# Patient Record
Sex: MALE | Race: WHITE | NOT HISPANIC OR LATINO | Employment: OTHER | URBAN - NONMETROPOLITAN AREA
[De-identification: names, ages, dates, MRNs, and addresses within clinical notes are randomized per-mention and may not be internally consistent; named-entity substitution may affect disease eponyms.]

---

## 2017-02-21 ENCOUNTER — OUTSIDE FACILITY SERVICE (OUTPATIENT)
Dept: FAMILY MEDICINE CLINIC | Facility: CLINIC | Age: 77
End: 2017-02-21

## 2017-02-21 PROCEDURE — 99308 SBSQ NF CARE LOW MDM 20: CPT | Performed by: FAMILY MEDICINE

## 2017-03-02 ENCOUNTER — OUTSIDE FACILITY SERVICE (OUTPATIENT)
Dept: FAMILY MEDICINE CLINIC | Facility: CLINIC | Age: 77
End: 2017-03-02

## 2017-03-02 PROCEDURE — 99308 SBSQ NF CARE LOW MDM 20: CPT | Performed by: FAMILY MEDICINE

## 2017-03-09 ENCOUNTER — OUTSIDE FACILITY SERVICE (OUTPATIENT)
Dept: FAMILY MEDICINE CLINIC | Facility: CLINIC | Age: 77
End: 2017-03-09

## 2017-03-09 PROCEDURE — 99308 SBSQ NF CARE LOW MDM 20: CPT | Performed by: FAMILY MEDICINE

## 2017-03-22 ENCOUNTER — OUTSIDE FACILITY SERVICE (OUTPATIENT)
Dept: FAMILY MEDICINE CLINIC | Facility: CLINIC | Age: 77
End: 2017-03-22

## 2017-03-22 PROCEDURE — 99308 SBSQ NF CARE LOW MDM 20: CPT | Performed by: FAMILY MEDICINE

## 2017-03-28 ENCOUNTER — OUTSIDE FACILITY SERVICE (OUTPATIENT)
Dept: FAMILY MEDICINE CLINIC | Facility: CLINIC | Age: 77
End: 2017-03-28

## 2017-03-28 PROCEDURE — 99308 SBSQ NF CARE LOW MDM 20: CPT | Performed by: FAMILY MEDICINE

## 2017-04-10 ENCOUNTER — OUTSIDE FACILITY SERVICE (OUTPATIENT)
Dept: FAMILY MEDICINE CLINIC | Facility: CLINIC | Age: 77
End: 2017-04-10

## 2017-04-11 ENCOUNTER — OUTSIDE FACILITY SERVICE (OUTPATIENT)
Dept: FAMILY MEDICINE CLINIC | Facility: CLINIC | Age: 77
End: 2017-04-11

## 2017-04-11 PROCEDURE — 99308 SBSQ NF CARE LOW MDM 20: CPT | Performed by: FAMILY MEDICINE

## 2017-04-19 ENCOUNTER — OUTSIDE FACILITY SERVICE (OUTPATIENT)
Dept: FAMILY MEDICINE CLINIC | Facility: CLINIC | Age: 77
End: 2017-04-19

## 2017-04-19 PROCEDURE — 99308 SBSQ NF CARE LOW MDM 20: CPT | Performed by: FAMILY MEDICINE

## 2017-04-25 ENCOUNTER — OUTSIDE FACILITY SERVICE (OUTPATIENT)
Dept: FAMILY MEDICINE CLINIC | Facility: CLINIC | Age: 77
End: 2017-04-25

## 2017-04-25 PROCEDURE — 99308 SBSQ NF CARE LOW MDM 20: CPT | Performed by: FAMILY MEDICINE

## 2017-05-03 ENCOUNTER — OUTSIDE FACILITY SERVICE (OUTPATIENT)
Dept: FAMILY MEDICINE CLINIC | Facility: CLINIC | Age: 77
End: 2017-05-03

## 2017-05-03 PROCEDURE — 99308 SBSQ NF CARE LOW MDM 20: CPT | Performed by: FAMILY MEDICINE

## 2017-05-09 ENCOUNTER — OUTSIDE FACILITY SERVICE (OUTPATIENT)
Dept: FAMILY MEDICINE CLINIC | Facility: CLINIC | Age: 77
End: 2017-05-09

## 2017-05-09 PROCEDURE — 99308 SBSQ NF CARE LOW MDM 20: CPT | Performed by: FAMILY MEDICINE

## 2017-05-17 ENCOUNTER — OUTSIDE FACILITY SERVICE (OUTPATIENT)
Dept: FAMILY MEDICINE CLINIC | Facility: CLINIC | Age: 77
End: 2017-05-17

## 2017-05-17 PROCEDURE — 99308 SBSQ NF CARE LOW MDM 20: CPT | Performed by: FAMILY MEDICINE

## 2017-05-24 ENCOUNTER — OUTSIDE FACILITY SERVICE (OUTPATIENT)
Dept: FAMILY MEDICINE CLINIC | Facility: CLINIC | Age: 77
End: 2017-05-24

## 2017-05-24 PROCEDURE — 99308 SBSQ NF CARE LOW MDM 20: CPT | Performed by: FAMILY MEDICINE

## 2017-06-06 ENCOUNTER — OUTSIDE FACILITY SERVICE (OUTPATIENT)
Dept: FAMILY MEDICINE CLINIC | Facility: CLINIC | Age: 77
End: 2017-06-06

## 2017-06-06 PROCEDURE — 99308 SBSQ NF CARE LOW MDM 20: CPT | Performed by: FAMILY MEDICINE

## 2017-06-09 ENCOUNTER — OUTSIDE FACILITY SERVICE (OUTPATIENT)
Dept: FAMILY MEDICINE CLINIC | Facility: CLINIC | Age: 77
End: 2017-06-09

## 2017-06-09 PROCEDURE — 99308 SBSQ NF CARE LOW MDM 20: CPT | Performed by: NURSE PRACTITIONER

## 2017-06-16 ENCOUNTER — OUTSIDE FACILITY SERVICE (OUTPATIENT)
Dept: FAMILY MEDICINE CLINIC | Facility: CLINIC | Age: 77
End: 2017-06-16

## 2017-06-16 PROCEDURE — 99308 SBSQ NF CARE LOW MDM 20: CPT | Performed by: NURSE PRACTITIONER

## 2017-06-20 ENCOUNTER — OUTSIDE FACILITY SERVICE (OUTPATIENT)
Dept: FAMILY MEDICINE CLINIC | Facility: CLINIC | Age: 77
End: 2017-06-20

## 2017-06-20 PROCEDURE — 99308 SBSQ NF CARE LOW MDM 20: CPT | Performed by: FAMILY MEDICINE

## 2017-06-23 ENCOUNTER — OUTSIDE FACILITY SERVICE (OUTPATIENT)
Dept: FAMILY MEDICINE CLINIC | Facility: CLINIC | Age: 77
End: 2017-06-23

## 2017-06-23 PROCEDURE — 99308 SBSQ NF CARE LOW MDM 20: CPT | Performed by: NURSE PRACTITIONER

## 2017-06-28 ENCOUNTER — OUTSIDE FACILITY SERVICE (OUTPATIENT)
Dept: FAMILY MEDICINE CLINIC | Facility: CLINIC | Age: 77
End: 2017-06-28

## 2017-06-28 PROCEDURE — 99308 SBSQ NF CARE LOW MDM 20: CPT | Performed by: FAMILY MEDICINE

## 2017-07-07 ENCOUNTER — OUTSIDE FACILITY SERVICE (OUTPATIENT)
Dept: FAMILY MEDICINE CLINIC | Facility: CLINIC | Age: 77
End: 2017-07-07

## 2017-07-07 PROCEDURE — 99308 SBSQ NF CARE LOW MDM 20: CPT | Performed by: NURSE PRACTITIONER

## 2017-07-13 ENCOUNTER — OUTSIDE FACILITY SERVICE (OUTPATIENT)
Dept: FAMILY MEDICINE CLINIC | Facility: CLINIC | Age: 77
End: 2017-07-13

## 2017-07-13 PROCEDURE — 99308 SBSQ NF CARE LOW MDM 20: CPT | Performed by: NURSE PRACTITIONER

## 2017-07-27 ENCOUNTER — OUTSIDE FACILITY SERVICE (OUTPATIENT)
Dept: FAMILY MEDICINE CLINIC | Facility: CLINIC | Age: 77
End: 2017-07-27

## 2017-07-27 PROCEDURE — 99308 SBSQ NF CARE LOW MDM 20: CPT | Performed by: NURSE PRACTITIONER

## 2017-08-03 ENCOUNTER — OUTSIDE FACILITY SERVICE (OUTPATIENT)
Dept: FAMILY MEDICINE CLINIC | Facility: CLINIC | Age: 77
End: 2017-08-03

## 2017-08-03 PROCEDURE — 99308 SBSQ NF CARE LOW MDM 20: CPT | Performed by: NURSE PRACTITIONER

## 2017-08-10 ENCOUNTER — OUTSIDE FACILITY SERVICE (OUTPATIENT)
Dept: FAMILY MEDICINE CLINIC | Facility: CLINIC | Age: 77
End: 2017-08-10

## 2017-08-10 PROCEDURE — 99308 SBSQ NF CARE LOW MDM 20: CPT | Performed by: NURSE PRACTITIONER

## 2017-08-24 ENCOUNTER — OUTSIDE FACILITY SERVICE (OUTPATIENT)
Dept: FAMILY MEDICINE CLINIC | Facility: CLINIC | Age: 77
End: 2017-08-24

## 2017-08-24 PROCEDURE — 99308 SBSQ NF CARE LOW MDM 20: CPT | Performed by: NURSE PRACTITIONER

## 2017-08-29 ENCOUNTER — OUTSIDE FACILITY SERVICE (OUTPATIENT)
Dept: FAMILY MEDICINE CLINIC | Facility: CLINIC | Age: 77
End: 2017-08-29

## 2017-08-29 PROCEDURE — 99309 SBSQ NF CARE MODERATE MDM 30: CPT | Performed by: FAMILY MEDICINE

## 2017-09-07 ENCOUNTER — OUTSIDE FACILITY SERVICE (OUTPATIENT)
Dept: FAMILY MEDICINE CLINIC | Facility: CLINIC | Age: 77
End: 2017-09-07

## 2017-09-07 PROCEDURE — 99308 SBSQ NF CARE LOW MDM 20: CPT | Performed by: NURSE PRACTITIONER

## 2017-09-27 ENCOUNTER — OUTSIDE FACILITY SERVICE (OUTPATIENT)
Dept: FAMILY MEDICINE CLINIC | Facility: CLINIC | Age: 77
End: 2017-09-27

## 2017-09-27 PROCEDURE — 99309 SBSQ NF CARE MODERATE MDM 30: CPT | Performed by: FAMILY MEDICINE

## 2017-09-28 ENCOUNTER — OUTSIDE FACILITY SERVICE (OUTPATIENT)
Dept: FAMILY MEDICINE CLINIC | Facility: CLINIC | Age: 77
End: 2017-09-28

## 2017-09-28 PROCEDURE — 99308 SBSQ NF CARE LOW MDM 20: CPT | Performed by: NURSE PRACTITIONER

## 2017-10-10 ENCOUNTER — OUTSIDE FACILITY SERVICE (OUTPATIENT)
Dept: FAMILY MEDICINE CLINIC | Facility: CLINIC | Age: 77
End: 2017-10-10

## 2017-10-10 PROCEDURE — 99309 SBSQ NF CARE MODERATE MDM 30: CPT | Performed by: FAMILY MEDICINE

## 2017-10-12 ENCOUNTER — OUTSIDE FACILITY SERVICE (OUTPATIENT)
Dept: FAMILY MEDICINE CLINIC | Facility: CLINIC | Age: 77
End: 2017-10-12

## 2017-10-12 PROCEDURE — 99308 SBSQ NF CARE LOW MDM 20: CPT | Performed by: NURSE PRACTITIONER

## 2017-10-24 ENCOUNTER — OUTSIDE FACILITY SERVICE (OUTPATIENT)
Dept: FAMILY MEDICINE CLINIC | Facility: CLINIC | Age: 77
End: 2017-10-24

## 2017-10-24 PROCEDURE — 99309 SBSQ NF CARE MODERATE MDM 30: CPT | Performed by: FAMILY MEDICINE

## 2017-10-26 ENCOUNTER — OUTSIDE FACILITY SERVICE (OUTPATIENT)
Dept: FAMILY MEDICINE CLINIC | Facility: CLINIC | Age: 77
End: 2017-10-26

## 2017-10-26 PROCEDURE — 99308 SBSQ NF CARE LOW MDM 20: CPT | Performed by: NURSE PRACTITIONER

## 2017-11-08 ENCOUNTER — OUTSIDE FACILITY SERVICE (OUTPATIENT)
Dept: FAMILY MEDICINE CLINIC | Facility: CLINIC | Age: 77
End: 2017-11-08

## 2017-11-08 PROCEDURE — 99309 SBSQ NF CARE MODERATE MDM 30: CPT | Performed by: FAMILY MEDICINE

## 2017-11-09 ENCOUNTER — OUTSIDE FACILITY SERVICE (OUTPATIENT)
Dept: FAMILY MEDICINE CLINIC | Facility: CLINIC | Age: 77
End: 2017-11-09

## 2017-11-09 PROCEDURE — 99308 SBSQ NF CARE LOW MDM 20: CPT | Performed by: NURSE PRACTITIONER

## 2017-11-30 ENCOUNTER — OUTSIDE FACILITY SERVICE (OUTPATIENT)
Dept: FAMILY MEDICINE CLINIC | Facility: CLINIC | Age: 77
End: 2017-11-30

## 2017-11-30 PROCEDURE — 99308 SBSQ NF CARE LOW MDM 20: CPT | Performed by: NURSE PRACTITIONER

## 2017-12-05 ENCOUNTER — OUTSIDE FACILITY SERVICE (OUTPATIENT)
Dept: FAMILY MEDICINE CLINIC | Facility: CLINIC | Age: 77
End: 2017-12-05

## 2017-12-05 PROCEDURE — 99309 SBSQ NF CARE MODERATE MDM 30: CPT | Performed by: FAMILY MEDICINE

## 2017-12-19 ENCOUNTER — OUTSIDE FACILITY SERVICE (OUTPATIENT)
Dept: FAMILY MEDICINE CLINIC | Facility: CLINIC | Age: 77
End: 2017-12-19

## 2017-12-19 PROCEDURE — 99309 SBSQ NF CARE MODERATE MDM 30: CPT | Performed by: FAMILY MEDICINE

## 2017-12-21 ENCOUNTER — OUTSIDE FACILITY SERVICE (OUTPATIENT)
Dept: FAMILY MEDICINE CLINIC | Facility: CLINIC | Age: 77
End: 2017-12-21

## 2017-12-21 PROCEDURE — 99308 SBSQ NF CARE LOW MDM 20: CPT | Performed by: NURSE PRACTITIONER

## 2017-12-28 ENCOUNTER — LAB REQUISITION (OUTPATIENT)
Dept: LAB | Facility: HOSPITAL | Age: 77
End: 2017-12-28

## 2017-12-28 DIAGNOSIS — Z00.00 ROUTINE GENERAL MEDICAL EXAMINATION AT A HEALTH CARE FACILITY: ICD-10-CM

## 2017-12-28 DIAGNOSIS — D64.9 ANEMIA: ICD-10-CM

## 2017-12-28 DIAGNOSIS — R68.89 OTHER GENERAL SYMPTOMS AND SIGNS: ICD-10-CM

## 2017-12-28 LAB
ALBUMIN SERPL-MCNC: 3.5 G/DL (ref 3.2–4.8)
ALBUMIN/GLOB SERPL: 1.4 G/DL (ref 1.5–2.5)
ALP SERPL-CCNC: 86 U/L (ref 25–100)
ALT SERPL W P-5'-P-CCNC: 20 U/L (ref 7–40)
ANION GAP SERPL CALCULATED.3IONS-SCNC: 5 MMOL/L (ref 3–11)
AST SERPL-CCNC: 48 U/L (ref 0–33)
BASOPHILS # BLD AUTO: 0.01 10*3/MM3 (ref 0–0.2)
BASOPHILS NFR BLD AUTO: 0.2 % (ref 0–1)
BILIRUB SERPL-MCNC: 0.2 MG/DL (ref 0.3–1.2)
BUN BLD-MCNC: 23 MG/DL (ref 9–23)
BUN/CREAT SERPL: 28.8 (ref 7–25)
CALCIUM SPEC-SCNC: 8.3 MG/DL (ref 8.7–10.4)
CHLORIDE SERPL-SCNC: 105 MMOL/L (ref 99–109)
CO2 SERPL-SCNC: 32 MMOL/L (ref 20–31)
CREAT BLD-MCNC: 0.8 MG/DL (ref 0.6–1.3)
DEPRECATED RDW RBC AUTO: 48.5 FL (ref 37–54)
EOSINOPHIL # BLD AUTO: 0 10*3/MM3 (ref 0–0.3)
EOSINOPHIL NFR BLD AUTO: 0 % (ref 0–3)
ERYTHROCYTE [DISTWIDTH] IN BLOOD BY AUTOMATED COUNT: 14.2 % (ref 11.3–14.5)
GFR SERPL CREATININE-BSD FRML MDRD: 93 ML/MIN/1.73
GLOBULIN UR ELPH-MCNC: 2.5 GM/DL
GLUCOSE BLD-MCNC: 88 MG/DL (ref 70–100)
HCT VFR BLD AUTO: 37.7 % (ref 38.9–50.9)
HGB BLD-MCNC: 12 G/DL (ref 13.1–17.5)
IMM GRANULOCYTES # BLD: 0.01 10*3/MM3 (ref 0–0.03)
IMM GRANULOCYTES NFR BLD: 0.2 % (ref 0–0.6)
LYMPHOCYTES # BLD AUTO: 0.89 10*3/MM3 (ref 0.6–4.8)
LYMPHOCYTES NFR BLD AUTO: 19.2 % (ref 24–44)
MCH RBC QN AUTO: 29.7 PG (ref 27–31)
MCHC RBC AUTO-ENTMCNC: 31.8 G/DL (ref 32–36)
MCV RBC AUTO: 93.3 FL (ref 80–99)
MONOCYTES # BLD AUTO: 0.39 10*3/MM3 (ref 0–1)
MONOCYTES NFR BLD AUTO: 8.4 % (ref 0–12)
NEUTROPHILS # BLD AUTO: 3.34 10*3/MM3 (ref 1.5–8.3)
NEUTROPHILS NFR BLD AUTO: 72 % (ref 41–71)
PLATELET # BLD AUTO: 168 10*3/MM3 (ref 150–450)
PMV BLD AUTO: 10.7 FL (ref 6–12)
POTASSIUM BLD-SCNC: 3.9 MMOL/L (ref 3.5–5.5)
PROT SERPL-MCNC: 6 G/DL (ref 5.7–8.2)
RBC # BLD AUTO: 4.04 10*6/MM3 (ref 4.2–5.76)
SODIUM BLD-SCNC: 142 MMOL/L (ref 132–146)
WBC NRBC COR # BLD: 4.64 10*3/MM3 (ref 3.5–10.8)

## 2017-12-28 PROCEDURE — 80053 COMPREHEN METABOLIC PANEL: CPT

## 2017-12-28 PROCEDURE — 85025 COMPLETE CBC W/AUTO DIFF WBC: CPT

## 2018-01-09 ENCOUNTER — OUTSIDE FACILITY SERVICE (OUTPATIENT)
Dept: INTERNAL MEDICINE | Facility: CLINIC | Age: 78
End: 2018-01-09

## 2018-01-09 PROCEDURE — 99309 SBSQ NF CARE MODERATE MDM 30: CPT | Performed by: FAMILY MEDICINE

## 2018-01-18 ENCOUNTER — OUTSIDE FACILITY SERVICE (OUTPATIENT)
Dept: FAMILY MEDICINE CLINIC | Facility: CLINIC | Age: 78
End: 2018-01-18

## 2018-01-18 ENCOUNTER — OUTSIDE FACILITY SERVICE (OUTPATIENT)
Dept: INTERNAL MEDICINE | Facility: CLINIC | Age: 78
End: 2018-01-18

## 2018-01-18 PROCEDURE — 99309 SBSQ NF CARE MODERATE MDM 30: CPT | Performed by: FAMILY MEDICINE

## 2018-01-18 PROCEDURE — 99308 SBSQ NF CARE LOW MDM 20: CPT | Performed by: NURSE PRACTITIONER

## 2018-01-31 ENCOUNTER — OUTSIDE FACILITY SERVICE (OUTPATIENT)
Dept: INTERNAL MEDICINE | Facility: CLINIC | Age: 78
End: 2018-01-31

## 2018-01-31 PROCEDURE — 99309 SBSQ NF CARE MODERATE MDM 30: CPT | Performed by: FAMILY MEDICINE

## 2018-02-08 ENCOUNTER — OUTSIDE FACILITY SERVICE (OUTPATIENT)
Dept: FAMILY MEDICINE CLINIC | Facility: CLINIC | Age: 78
End: 2018-02-08

## 2018-02-08 PROCEDURE — 99308 SBSQ NF CARE LOW MDM 20: CPT | Performed by: NURSE PRACTITIONER

## 2018-02-13 ENCOUNTER — OUTSIDE FACILITY SERVICE (OUTPATIENT)
Dept: INTERNAL MEDICINE | Facility: CLINIC | Age: 78
End: 2018-02-13

## 2018-02-13 PROCEDURE — 99309 SBSQ NF CARE MODERATE MDM 30: CPT | Performed by: FAMILY MEDICINE

## 2018-02-27 ENCOUNTER — OUTSIDE FACILITY SERVICE (OUTPATIENT)
Dept: INTERNAL MEDICINE | Facility: CLINIC | Age: 78
End: 2018-02-27

## 2018-02-27 PROCEDURE — 99309 SBSQ NF CARE MODERATE MDM 30: CPT | Performed by: FAMILY MEDICINE

## 2018-03-07 ENCOUNTER — OUTSIDE FACILITY SERVICE (OUTPATIENT)
Dept: INTERNAL MEDICINE | Facility: CLINIC | Age: 78
End: 2018-03-07

## 2018-03-07 PROCEDURE — 99309 SBSQ NF CARE MODERATE MDM 30: CPT | Performed by: FAMILY MEDICINE

## 2018-03-10 ENCOUNTER — APPOINTMENT (OUTPATIENT)
Dept: CT IMAGING | Facility: HOSPITAL | Age: 78
End: 2018-03-10

## 2018-03-10 ENCOUNTER — HOSPITAL ENCOUNTER (EMERGENCY)
Facility: HOSPITAL | Age: 78
Discharge: NURSING, REHAB OR LONG-TERM CARE | End: 2018-03-10
Attending: STUDENT IN AN ORGANIZED HEALTH CARE EDUCATION/TRAINING PROGRAM | Admitting: STUDENT IN AN ORGANIZED HEALTH CARE EDUCATION/TRAINING PROGRAM

## 2018-03-10 ENCOUNTER — APPOINTMENT (OUTPATIENT)
Dept: GENERAL RADIOLOGY | Facility: HOSPITAL | Age: 78
End: 2018-03-10

## 2018-03-10 VITALS
RESPIRATION RATE: 16 BRPM | OXYGEN SATURATION: 95 % | WEIGHT: 120 LBS | HEIGHT: 70 IN | BODY MASS INDEX: 17.18 KG/M2 | DIASTOLIC BLOOD PRESSURE: 58 MMHG | SYSTOLIC BLOOD PRESSURE: 101 MMHG | TEMPERATURE: 97.5 F | HEART RATE: 49 BPM

## 2018-03-10 DIAGNOSIS — S01.81XA LACERATION OF MULTIPLE SITES OF FACE: ICD-10-CM

## 2018-03-10 DIAGNOSIS — S00.93XA CONTUSION OF HEAD, INITIAL ENCOUNTER: ICD-10-CM
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
## 2018-03-10 PROCEDURE — 99283 EMERGENCY DEPT VISIT LOW MDM: CPT

## 2018-03-10 PROCEDURE — 72125 CT NECK SPINE W/O DYE: CPT

## 2018-03-10 PROCEDURE — 70450 CT HEAD/BRAIN W/O DYE: CPT

## 2018-03-10 PROCEDURE — 72131 CT LUMBAR SPINE W/O DYE: CPT

## 2018-03-10 PROCEDURE — 70486 CT MAXILLOFACIAL W/O DYE: CPT

## 2018-03-10 PROCEDURE — 73523 X-RAY EXAM HIPS BI 5/> VIEWS: CPT

## 2018-03-10 RX ORDER — RIVASTIGMINE TARTRATE 3 MG/1
3 CAPSULE ORAL 2 TIMES DAILY WITH MEALS
COMMUNITY

## 2018-03-10 RX ORDER — DIAZEPAM 10 MG/1
10 TABLET ORAL 2 TIMES DAILY PRN
COMMUNITY
End: 2018-11-10 | Stop reason: SDUPTHER

## 2018-03-10 RX ORDER — MIRTAZAPINE 15 MG/1
7.5 TABLET, FILM COATED ORAL NIGHTLY
COMMUNITY

## 2018-03-10 RX ORDER — ATROPINE SULFATE 10 MG/ML
1 SOLUTION/ DROPS OPHTHALMIC 2 TIMES DAILY
COMMUNITY

## 2018-03-10 RX ORDER — IPRATROPIUM BROMIDE AND ALBUTEROL SULFATE 2.5; .5 MG/3ML; MG/3ML
3 SOLUTION RESPIRATORY (INHALATION) EVERY 4 HOURS PRN
COMMUNITY

## 2018-03-10 RX ORDER — HYDROCODONE BITARTRATE AND ACETAMINOPHEN 10; 325 MG/1; MG/1
1 TABLET ORAL EVERY 4 HOURS PRN
COMMUNITY
End: 2018-10-10 | Stop reason: SDUPTHER

## 2018-03-10 RX ORDER — PAROXETINE 30 MG/1
30 TABLET, FILM COATED ORAL EVERY MORNING
COMMUNITY

## 2018-03-10 RX ORDER — LACTULOSE 10 G/15ML
20 SOLUTION ORAL 2 TIMES DAILY PRN
COMMUNITY

## 2018-03-10 RX ORDER — ACETAMINOPHEN 500 MG
500 TABLET ORAL EVERY 6 HOURS PRN
COMMUNITY

## 2018-03-10 RX ORDER — ASPIRIN 81 MG/1
81 TABLET, CHEWABLE ORAL DAILY
COMMUNITY

## 2018-03-10 RX ORDER — AMMONIUM LACTATE 120 MG/G
CREAM TOPICAL AS NEEDED
COMMUNITY

## 2018-03-10 RX ORDER — TRAZODONE HYDROCHLORIDE 50 MG/1
50 TABLET ORAL NIGHTLY
COMMUNITY

## 2018-03-10 RX ORDER — BISACODYL 10 MG
10 SUPPOSITORY, RECTAL RECTAL DAILY
COMMUNITY

## 2018-03-10 RX ORDER — RANITIDINE 150 MG/1
150 TABLET ORAL 2 TIMES DAILY
COMMUNITY

## 2018-03-11 NOTE — DISCHARGE INSTRUCTIONS
Contusion  A contusion is a deep bruise. Contusions happen when an injury causes bleeding under the skin. Symptoms of bruising include pain, swelling, and discolored skin. The skin may turn blue, purple, or yellow.  Follow these instructions at home:  · Rest the injured area.  · If told, put ice on the injured area.  ¨ Put ice in a plastic bag.  ¨ Place a towel between your skin and the bag.  ¨ Leave the ice on for 20 minutes, 2-3 times per day.  · If told, put light pressure (compression) on the injured area using an elastic bandage. Make sure the bandage is not too tight. Remove it and put it back on as told by your doctor.  · If possible, raise (elevate) the injured area above the level of your heart while you are sitting or lying down.  · Take over-the-counter and prescription medicines only as told by your doctor.  Contact a doctor if:  · Your symptoms do not get better after several days of treatment.  · Your symptoms get worse.  · You have trouble moving the injured area.  Get help right away if:  · You have very bad pain.  · You have a loss of feeling (numbness) in a hand or foot.  · Your hand or foot turns pale or cold.  This information is not intended to replace advice given to you by your health care provider. Make sure you discuss any questions you have with your health care provider.  Document Released: 06/05/2009 Document Revised: 05/25/2017 Document Reviewed: 05/04/2016  [employer] Interactive Patient Education © 2017 [employer]      Facial Laceration  A facial laceration is a cut on the face. These injuries can be painful and cause bleeding. Some cuts may need to be closed with stitches (sutures), skin adhesive strips, or wound glue. Cuts usually heal quickly but can leave a scar. It can take 1-2 years for the scar to go away completely.  Follow these instructions at home:  · Only take medicines as told by your doctor.  · Follow your doctor's instructions for wound care.  For Stitches:   · Keep the  cut clean and dry.  · If you have a bandage (dressing), change it at least once a day. Change the bandage if it gets wet or dirty, or as told by your doctor.  · Wash the cut with soap and water 2 times a day. Rinse the cut with water. Pat it dry with a clean towel.  · Put a thin layer of medicated cream on the cut as told by your doctor.  · You may shower after the first 24 hours. Do not soak the cut in water until the stitches are removed.  · Have your stitches removed as told by your doctor.  · Do not wear any makeup until a few days after your stitches are removed.  For Skin Adhesive Strips:   · Keep the cut clean and dry.  · Do not get the strips wet. You may take a bath, but be careful to keep the cut dry.  · If the cut gets wet, pat it dry with a clean towel.  · The strips will fall off on their own. Do not remove the strips that are still stuck to the cut.  For Wound Glue:   · You may shower or take baths. Do not soak or scrub the cut. Do not swim. Avoid heavy sweating until the glue falls off on its own. After a shower or bath, pat the cut dry with a clean towel.  · Do not put medicine or makeup on your cut until the glue falls off.  · If you have a bandage, do not put tape over the glue.  · Avoid lots of sunlight or tanning lamps until the glue falls off.  · The glue will fall off on its own in 5-10 days. Do not pick at the glue.  After Healing:   · Put sunscreen on the cut for the first year to reduce your scar.  Contact a doctor if:  · You have a fever.  Get help right away if:  · Your cut area gets red, painful, or puffy (swollen).  · You see a yellowish-white fluid (pus) coming from the cut.  This information is not intended to replace advice given to you by your health care provider. Make sure you discuss any questions you have with your health care provider.  Document Released: 06/05/2009 Document Revised: 05/25/2017 Document Reviewed: 07/31/2014  [employer] Interactive Patient Education © 2017 [employer]

## 2018-03-11 NOTE — ED PROVIDER NOTES
Subjective   This is a 78-year-old male that comes in by EMS after fall at nursing home.  Nursing home personnel states the patient fell hitting his face and head out of a wheelchair.  They deny any loss of consciousness.  On presentation patient is altered.  It is reported by EMS this is his baseline.  There are no other reported injuries at this time.        History provided by:  Nursing home and relative   used: No    Fall   Mechanism of injury: fall    Injury location:  Face  Facial injury location:  Face  Incident [facility]:  [facility]  Time since incident:  2 days  Arrived directly from scene: no    Fall:     Fall occurred:  Walking    Point of impact:  Face    Entrapped after fall: no    Suspicion of alcohol use: no    Suspicion of drug use: no    Tetanus status:  Unknown  Prior to arrival data:     Patient ambulatory at scene: no      Blood loss:  None    Orientation at scene:  Person, place, situation and time    Loss of consciousness: no      Amnesic to event: no      Airway interventions:  None    Breathing interventions:  None    IV access status:  None    IO access:  None    Fluids administered:  None  Associated symptoms: back pain    Associated symptoms: no chest pain and no hearing loss        Review of Systems   Constitutional: Negative for activity change and appetite change.   HENT: Negative for hearing loss.    Cardiovascular: Negative for chest pain.   Musculoskeletal: Positive for arthralgias, back pain and myalgias.   Skin: Positive for wound.   Neurological: Positive for weakness.   Psychiatric/Behavioral: Positive for confusion. Negative for hallucinations. The patient is not nervous/anxious and is not hyperactive.    All other systems reviewed and are negative.      Past Medical History:   Diagnosis Date   • Anemia    • Anxiety    • Cancer    • Chronic pain    • Deaf    • Dementia    • Depression    • Dysphasia    • Hyperlipidemia    • Menieres disease    • Parkinson  disease        Allergies   Allergen Reactions   • Depo-Medrol [Methylprednisolone Acetate] Unknown (See Comments)     Obtained from NH list. Pt nonverbal.   • Prednisone Unknown (See Comments)     Obtained from NH list. Pt nonverbal.       Past Surgical History:   Procedure Laterality Date   • INNER EAR SURGERY     • PROSTATECTOMY         History reviewed. No pertinent family history.    Social History     Social History   • Marital status: Unknown     Social History Main Topics   • Smoking status: Former Smoker   • Alcohol use No   • Drug use: No   • Sexual activity: Defer     Other Topics Concern   • Not on file           Objective   Physical Exam   Constitutional:  Non-toxic appearance. He has a sickly appearance. He does not appear ill.   HENT:   Head: Normocephalic.       Right Ear: External ear normal.   Left Ear: External ear normal.   Nose: Nose normal.   Mouth/Throat: Oropharynx is clear and moist. No oropharyngeal exudate.   Small superficial laceration above left eyebrow approximately 3 cm in depth.  Superficial laceration approximately 1 cm across bridge of nose.   Eyes: Conjunctivae and EOM are normal. Pupils are equal, round, and reactive to light. Right eye exhibits no discharge. Left eye exhibits no discharge.   Neck: Normal range of motion. Neck supple. No JVD present. No tracheal deviation present. No thyromegaly present.   Cardiovascular: Normal rate, regular rhythm, normal heart sounds and intact distal pulses.  Exam reveals no friction rub.    No murmur heard.  Pulmonary/Chest: Effort normal and breath sounds normal. No respiratory distress. He has no wheezes. He has no rales.   Abdominal: Soft. Bowel sounds are normal. He exhibits no distension and no mass. There is no tenderness. There is no guarding.   Musculoskeletal: He exhibits no edema, tenderness or deformity.        Right shoulder: He exhibits normal range of motion, no tenderness, no deformity, no laceration, no pain and no spasm.    Neurological: He is disoriented. No cranial nerve deficit or sensory deficit.   Skin: Skin is warm. Capillary refill takes less than 2 seconds. No erythema. No pallor.   Psychiatric: He is attentive.   Nursing note and vitals reviewed.      Laceration Repair  Date/Time: 3/10/2018 10:45 PM  Performed by: EDITH FENTON  Authorized by: CRISTINA QUINONES     Consent:     Consent obtained:  Verbal    Consent given by:  Patient    Risks discussed:  Pain    Alternatives discussed:  No treatment  Anesthesia (see MAR for exact dosages):     Anesthesia method:  None  Laceration details:     Location:  Face    Face location:  L eyebrow    Length (cm):  2.5  Treatment:     Area cleansed with:  Saline and Hibiclens    Amount of cleaning:  Standard    Irrigation solution:  Sterile saline    Irrigation method:  Pressure wash    Visualized foreign bodies/material removed: no    Skin repair:     Repair method:  Tissue adhesive  Approximation:     Approximation:  Close  Post-procedure details:     Patient tolerance of procedure:  Tolerated well, no immediate complications  Comments:      N/V intact.              ED Course  ED Course   Comment By Time   This is a 78-year-old male comes in from nursing home after falling from a wheelchair.  Patient did hit his head and sustained lacerations to his nose and above his left eyebrow.  Patient's lacerations were approximated with Shur-Close skin adhesive.  Patient did have CT scan and x-rays to further evaluate for any acute fractures or intracranial bleed. Patient imaging was negative for any acute fracture.  Edith Fenton PA-C 03/10 5930                  MDM  Number of Diagnoses or Management Options  Contusion of head, initial encounter: new and requires workup  Fall, initial encounter: new and requires workup  Laceration of multiple sites of face: new and requires workup     Amount and/or Complexity of Data Reviewed  Tests in the radiology section of CPT®:  reviewed  Independent visualization of images, tracings, or specimens: yes    Risk of Complications, Morbidity, and/or Mortality  Presenting problems: moderate  Diagnostic procedures: moderate  Management options: moderate    Patient Progress  Patient progress: stable      Final diagnoses:   Fall, initial encounter   Laceration of multiple sites of face   Contusion of head, initial encounter            Santiago Fenton PA-C  03/10/18 [id number]

## 2018-03-27 ENCOUNTER — OUTSIDE FACILITY SERVICE (OUTPATIENT)
Dept: INTERNAL MEDICINE | Facility: CLINIC | Age: 78
End: 2018-03-27

## 2018-03-27 PROCEDURE — 99309 SBSQ NF CARE MODERATE MDM 30: CPT | Performed by: FAMILY MEDICINE

## 2018-03-29 ENCOUNTER — OUTSIDE FACILITY SERVICE (OUTPATIENT)
Dept: FAMILY MEDICINE CLINIC | Facility: CLINIC | Age: 78
End: 2018-03-29

## 2018-03-29 PROCEDURE — 99308 SBSQ NF CARE LOW MDM 20: CPT | Performed by: NURSE PRACTITIONER

## 2018-04-10 ENCOUNTER — OUTSIDE FACILITY SERVICE (OUTPATIENT)
Dept: INTERNAL MEDICINE | Facility: CLINIC | Age: 78
End: 2018-04-10

## 2018-04-10 PROCEDURE — 99309 SBSQ NF CARE MODERATE MDM 30: CPT | Performed by: FAMILY MEDICINE

## 2018-04-17 ENCOUNTER — OUTSIDE FACILITY SERVICE (OUTPATIENT)
Dept: INTERNAL MEDICINE | Facility: CLINIC | Age: 78
End: 2018-04-17

## 2018-04-17 PROCEDURE — 99309 SBSQ NF CARE MODERATE MDM 30: CPT | Performed by: FAMILY MEDICINE

## 2018-04-24 ENCOUNTER — OUTSIDE FACILITY SERVICE (OUTPATIENT)
Dept: INTERNAL MEDICINE | Facility: CLINIC | Age: 78
End: 2018-04-24

## 2018-04-24 PROCEDURE — 99309 SBSQ NF CARE MODERATE MDM 30: CPT | Performed by: FAMILY MEDICINE

## 2018-05-24 ENCOUNTER — OUTSIDE FACILITY SERVICE (OUTPATIENT)
Dept: FAMILY MEDICINE CLINIC | Facility: CLINIC | Age: 78
End: 2018-05-24

## 2018-05-24 PROCEDURE — 99308 SBSQ NF CARE LOW MDM 20: CPT | Performed by: NURSE PRACTITIONER

## 2018-06-21 ENCOUNTER — OUTSIDE FACILITY SERVICE (OUTPATIENT)
Dept: FAMILY MEDICINE CLINIC | Facility: CLINIC | Age: 78
End: 2018-06-21

## 2018-06-21 PROCEDURE — 99308 SBSQ NF CARE LOW MDM 20: CPT | Performed by: NURSE PRACTITIONER

## 2018-07-09 ENCOUNTER — NURSING HOME (OUTPATIENT)
Dept: FAMILY MEDICINE CLINIC | Facility: CLINIC | Age: 78
End: 2018-07-09

## 2018-07-09 DIAGNOSIS — G20 DEMENTIA DUE TO PARKINSON'S DISEASE WITH BEHAVIORAL DISTURBANCE (HCC): Chronic | ICD-10-CM

## 2018-07-09 DIAGNOSIS — G20 PARKINSON'S DISEASE (HCC): Chronic | ICD-10-CM

## 2018-07-09 DIAGNOSIS — F02.818 DEMENTIA DUE TO PARKINSON'S DISEASE WITH BEHAVIORAL DISTURBANCE (HCC): Chronic | ICD-10-CM

## 2018-07-09 DIAGNOSIS — Z74.09 IMPAIRED MOBILITY AND ADLS: Chronic | ICD-10-CM

## 2018-07-09 DIAGNOSIS — Z78.9 IMPAIRED MOBILITY AND ADLS: Chronic | ICD-10-CM

## 2018-07-09 DIAGNOSIS — R13.12 OROPHARYNGEAL DYSPHAGIA: Primary | Chronic | ICD-10-CM

## 2018-07-09 DIAGNOSIS — R54 AGE-RELATED PHYSICAL DEBILITY: Chronic | ICD-10-CM

## 2018-07-09 PROCEDURE — 99309 SBSQ NF CARE MODERATE MDM 30: CPT | Performed by: FAMILY MEDICINE

## 2018-07-15 PROBLEM — Z74.09 IMPAIRED MOBILITY AND ADLS: Chronic | Status: ACTIVE | Noted: 2018-07-15

## 2018-07-15 PROBLEM — G20 DEMENTIA DUE TO PARKINSON'S DISEASE WITH BEHAVIORAL DISTURBANCE (HCC): Chronic | Status: ACTIVE | Noted: 2018-07-15

## 2018-07-15 PROBLEM — R54 AGE-RELATED PHYSICAL DEBILITY: Chronic | Status: ACTIVE | Noted: 2018-07-15

## 2018-07-15 PROBLEM — F02.818 DEMENTIA DUE TO PARKINSON'S DISEASE WITH BEHAVIORAL DISTURBANCE (HCC): Chronic | Status: ACTIVE | Noted: 2018-07-15

## 2018-07-15 PROBLEM — Z78.9 IMPAIRED MOBILITY AND ADLS: Chronic | Status: ACTIVE | Noted: 2018-07-15

## 2018-07-15 PROBLEM — G20 PARKINSON'S DISEASE (HCC): Chronic | Status: ACTIVE | Noted: 2018-07-15

## 2018-07-15 PROBLEM — R13.12 OROPHARYNGEAL DYSPHAGIA: Chronic | Status: ACTIVE | Noted: 2018-07-15

## 2018-07-16 ENCOUNTER — NURSING HOME (OUTPATIENT)
Dept: FAMILY MEDICINE CLINIC | Facility: CLINIC | Age: 78
End: 2018-07-16

## 2018-07-16 DIAGNOSIS — R13.12 OROPHARYNGEAL DYSPHAGIA: Chronic | ICD-10-CM

## 2018-07-16 DIAGNOSIS — R54 AGE-RELATED PHYSICAL DEBILITY: Chronic | ICD-10-CM

## 2018-07-16 DIAGNOSIS — G20 PARKINSON'S DISEASE (HCC): Chronic | ICD-10-CM

## 2018-07-16 DIAGNOSIS — Z78.9 IMPAIRED MOBILITY AND ADLS: Chronic | ICD-10-CM

## 2018-07-16 DIAGNOSIS — G20 DEMENTIA DUE TO PARKINSON'S DISEASE WITH BEHAVIORAL DISTURBANCE (HCC): Primary | Chronic | ICD-10-CM

## 2018-07-16 DIAGNOSIS — Z74.09 IMPAIRED MOBILITY AND ADLS: Chronic | ICD-10-CM

## 2018-07-16 DIAGNOSIS — F02.818 DEMENTIA DUE TO PARKINSON'S DISEASE WITH BEHAVIORAL DISTURBANCE (HCC): Primary | Chronic | ICD-10-CM

## 2018-07-16 PROCEDURE — 99309 SBSQ NF CARE MODERATE MDM 30: CPT | Performed by: FAMILY MEDICINE

## 2018-07-16 NOTE — PROGRESS NOTES
Nursing Home Progress Note      Patient Name: Yves Wilkins   YOB: 1940    Date of Service: 07/09/2018      Facility: [facility] []   [facility] []   [facility] []   [facility] [place] [x]   Other []       CHIEF COMPLAINT:  CMM/LTC     HISTORY OF PRESENT ILLNESS:  [x]  Follow Up visit for coordination of long term care issues and chronic medical management needs.        PAST MEDICAL & SURGICAL HISTORY:  Past Medical History:   Diagnosis Date   • Anemia    • Anxiety    • Cancer (CMS/HCC)    • Chronic pain    • Deaf    • Dementia    • Depression    • Dysphasia    • Hyperlipidemia    • Menieres disease    • Parkinson disease (CMS/HCC)       Past Surgical History:   Procedure Laterality Date   • INNER EAR SURGERY     • PROSTATECTOMY          MEDICATIONS:  Medication administration record and list reviewed on 7/15/2018     ALLERGIES:  Allergies   Allergen Reactions   • Depo-Medrol [Methylprednisolone Acetate] Unknown (See Comments)     Obtained from NH list. Pt nonverbal.   • Prednisone Unknown (See Comments)     Obtained from NH list. Pt nonverbal.        REVIEW OF SYSTEMS:  • Appetite: Fair []   Good [x]   Poor []   Weight Loss []  [x]  Weight Stable   Unavoidable Weight Loss []  Tolerating Tube Feeding []    Supplements Provided []   • Constitutional: Negative for fever, chills, diaphoresis or fatigue and weight change.   • HENT: No dysphagia; no changes to vision/hearing/smell/taste; no epistaxis  • Eyes: Negative for redness and visual disturbance.   • Respiratory: Negative for shortness of breath, chest pain, cough or chest tightness.   • Cardiovascular: Negative for chest pain and palpitations.   • Gastrointestinal: Negative for abdominal distention, abdominal pain and blood in stool.   • Endocrine: Negative for cold intolerance and heat intolerance.   • Genitourinary: Negative for difficulty urinating, dysuria and frequency.Negative for hematuria   • Musculoskeletal: Chronic myalgias and arthralgias  • Integumentary:  No open wounds, rash or concerning skin lesions  • Neurological: Negative for syncope, weakness and headaches.   • Hematological: Negative for adenopathy. Does not bruise/bleed easily.   • Immunological: Negative for reported allergies or immunological disorders  • Psychological: No depression, anxiety or suicidal ideation    PHYSICAL EXAMINATION:  VITAL SIGNS: /58, HR 66 bpm, RR 20, weight 104    General Appearance:  [x]  Alert   []  Oriented x 3  [x]  No acute distress    []  Confused  [x]  Disoriented   []  Comatose   Head:  Atraumatic and normocephalic, without obvious abnormality.   Eyes:          PERRLA, conjunctivae and sclerae normal, no Icterus. No pallor. Extra-occular movements are within normal limits.   Ears:  Ears appear intact with no abnormalities noted.   Throat: No oral lesions, no thrush, oral mucosa moist.   Neck: Supple, trachea midline, no thyromegaly, no carotid bruit.   Back:   No kyphoscoliosis. No tenderness to palpation.   Lungs:   Chest shape is normal. Breath sounds heard bilaterally equally.  No wheezing.  Bilateral basal crackles heard.    Heart:  Normal S1 and S2, no murmur, no gallop, no rub. No JVD.   Abdomen:   Normal bowel sounds, no masses, no organomegaly. Soft, non-tender, non-distended, no guarding    Extremities: Moves all extremities well, edema, cyanosis or clubbing.  Frail build.    Pulses: Pulses palpable and equal bilaterally.   Skin: No bleeding or rash.  Generalized dry skin noted.  Age-related atrophy of skin.   Neurologic: [] Normal speech []  Normal mental status    [x] Cranial nerves II through XII intact   [x]  No anosmia [x]  DTR 2+ [x]  Proprioception intact  []  No focal motor/sensory deficits  Chronic N/M def of adv PD     Psych/Mood:        [x]  No acute changes []  Depressed  Urinary:        []  Continent  [x]  Incontinent  []  Retention  []  F/C     []  UTI w/treatment in progress  RECORD REVIEW:   • Consult notes []   • Admission and subsequent notes  []   •  [x] I have personally reviewed and interpreted available lab data, radiology studies and ECG obtained at time of visit.  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  No reports of any focal aspirations.  Continue precautions with dietary last modifications.  No acute behavioral changes, sleeping well with current medication regimen.  No acute behavioral changes.  Pain appears well controlled at this time.  Routine surveillance labs.  Continue to follow dietary intake.    [x]  Discussed Patient in detail with nursing/staff, addressed all needs today.     [x]  Plan of Care Reviewed   []   PT/OT Reviewed   []  Order Changes  []   Discharge Plans Reviewed         Total face to face time spent with patient 25 minutes, 15 min in counseling.    Naresh Gupta DO.  7/15/2018

## 2018-07-18 NOTE — PROGRESS NOTES
Nursing Home Progress Note      Patient Name: Yves Wilkins   YOB: 1940    Date of Service: 07/16/2018      Facility: [facility] []   [facility] []   [facility] []   [facility] [x]   Other []       CHIEF COMPLAINT:  CMM/LTC     HISTORY OF PRESENT ILLNESS:  [x]  Follow Up visit for coordination of long term care issues and chronic medical management needs.    Planned follow-up of dementia with Parkinson's disease, advanced/severe staging, oral pharyngeal dysphagia.    PAST MEDICAL & SURGICAL HISTORY:  Past Medical History:   Diagnosis Date   • Anemia    • Anxiety    • Cancer (CMS/HCC)    • Chronic pain    • Deaf    • Dementia    • Depression    • Dysphasia    • Hyperlipidemia    • Menieres disease    • Parkinson disease (CMS/HCC)       Past Surgical History:   Procedure Laterality Date   • INNER EAR SURGERY     • PROSTATECTOMY          MEDICATIONS:  Medication administration record and list reviewed on 7/18/2018     ALLERGIES:  Allergies   Allergen Reactions   • Depo-Medrol [Methylprednisolone Acetate] Unknown (See Comments)     Obtained from NH list. Pt nonverbal.   • Prednisone Unknown (See Comments)     Obtained from NH list. Pt nonverbal.        REVIEW OF SYSTEMS:  • Appetite: Fair []   Good [x]   Poor []   Weight Loss []  [x]  Weight Stable   Unavoidable Weight Loss []  Tolerating Tube Feeding []    Supplements Provided []   • Constitutional: Negative for fever, chills, diaphoresis or fatigue and weight change.   • HENT: No dysphagia; no changes to vision/hearing/smell/taste; no epistaxis  • Eyes: Negative for redness and visual disturbance.   • Respiratory: Negative for shortness of breath, chest pain, cough or chest tightness.   • Cardiovascular: Negative for chest pain and palpitations.   • Gastrointestinal: Negative for abdominal distention, abdominal pain and blood in stool.   • Endocrine: Negative for cold intolerance and heat intolerance.   • Genitourinary: Negative for difficulty urinating, dysuria and  frequency.Negative for hematuria   • Musculoskeletal: Chronic myalgias and arthralgias  • Integumentary: No open wounds, rash or concerning skin lesions  • Neurological: Negative for syncope, weakness and headaches.   • Hematological: Negative for adenopathy. Does not bruise/bleed easily.   • Immunological: Negative for reported allergies or immunological disorders  • Psychological: No depression, anxiety or suicidal ideation    PHYSICAL EXAMINATION:  VITAL SIGNS: /58, HR 66 BPM, RR 20, weight 203 pounds    General Appearance:  [x]  Alert   []  Oriented x 3  [x]  No acute distress    []  Confused  [x]  Disoriented   []  Comatose   Head:  Atraumatic and normocephalic, without obvious abnormality.   Eyes:          PERRLA, conjunctivae and sclerae normal, no Icterus. No pallor. Extra-occular movements are within normal limits.   Ears:  Ears appear intact with no abnormalities noted.   Throat: No oral lesions, no thrush, oral mucosa moist.   Neck: Supple, trachea midline, no thyromegaly, no carotid bruit.   Back:   No kyphoscoliosis. No tenderness to palpation.   Lungs:   Chest shape is normal. Breath sounds heard bilaterally equally.  No wheezing.  Bilateral basal crackles heard.    Heart:  Normal S1 and S2, no murmur, no gallop, no rub. No JVD.   Abdomen:   Normal bowel sounds, no masses, no organomegaly. Soft, non-tender, non-distended, no guarding    Extremities: Moves all extremities well, edema, cyanosis or clubbing.  Frail build.    Pulses: Pulses palpable and equal bilaterally.   Skin: No bleeding or rash.  Generalized dry skin noted.  Age-related atrophy of skin.   Neurologic: [] Normal speech []  Normal mental status    [x] Cranial nerves II through XII intact   [x]  No anosmia [x]  DTR 2+ [x]  Proprioception intact  []  No focal motor/sensory deficits  Chronic N/M def of adv PD     Psych/Mood:        [x]  No acute changes []  Depressed  Urinary:        []  Continent  [x]  Incontinent  []  Retention  []   F/C     []  UTI w/treatment in progress  RECORD REVIEW:   • Consult notes []   • Admission and subsequent notes []   •  [x] I have personally reviewed and interpreted available lab data, radiology studies and ECG obtained at time of visit.  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  Staff has no reports of any focal aspirations.  Continue aspiration precautions with dietary/lifestyle modifications.  No acute behavioral changes, sleeping well with current medication regimen.  No acute behavioral changes, continue current medication regimen.  Pain appears to continue to be well controlled at this time.  Routine surveillance labs as needed.  Continue to follow dietary intake.    [x]  Discussed Patient in detail with nursing/staff, addressed all needs today.     [x]  Plan of Care Reviewed   []   PT/OT Reviewed   []  Order Changes  []   Discharge Plans Reviewed         Total face to face time spent with patient 25 minutes, 15 min in counseling.    Naresh Gupta DO.  7/18/2018

## 2018-07-23 ENCOUNTER — NURSING HOME (OUTPATIENT)
Dept: FAMILY MEDICINE CLINIC | Facility: CLINIC | Age: 78
End: 2018-07-23

## 2018-07-23 DIAGNOSIS — R13.12 OROPHARYNGEAL DYSPHAGIA: Chronic | ICD-10-CM

## 2018-07-23 DIAGNOSIS — F34.1 DYSTHYMIA: Chronic | ICD-10-CM

## 2018-07-23 DIAGNOSIS — G20 PARKINSON'S DISEASE (HCC): Chronic | ICD-10-CM

## 2018-07-23 DIAGNOSIS — F02.818 DEMENTIA DUE TO PARKINSON'S DISEASE WITH BEHAVIORAL DISTURBANCE (HCC): Primary | Chronic | ICD-10-CM

## 2018-07-23 DIAGNOSIS — Z74.09 IMPAIRED MOBILITY AND ADLS: Chronic | ICD-10-CM

## 2018-07-23 DIAGNOSIS — Z78.9 IMPAIRED MOBILITY AND ADLS: Chronic | ICD-10-CM

## 2018-07-23 DIAGNOSIS — R54 AGE-RELATED PHYSICAL DEBILITY: Chronic | ICD-10-CM

## 2018-07-23 DIAGNOSIS — G20 DEMENTIA DUE TO PARKINSON'S DISEASE WITH BEHAVIORAL DISTURBANCE (HCC): Primary | Chronic | ICD-10-CM

## 2018-07-23 PROCEDURE — 99309 SBSQ NF CARE MODERATE MDM 30: CPT | Performed by: FAMILY MEDICINE

## 2018-07-25 PROBLEM — F34.1 DYSTHYMIA: Chronic | Status: ACTIVE | Noted: 2018-07-25

## 2018-07-25 NOTE — PROGRESS NOTES
Nursing Home Progress Note      Patient Name: Yves Wilkins   YOB: 1940    Date of Service: 07/23/2018      Facility: [place] []   [place] []   [facility] []   [facility] [x]   Other []       CHIEF COMPLAINT:  CMM/LTC     HISTORY OF PRESENT ILLNESS:  [x]  Follow Up visit for coordination of long term care issues and chronic medical management needs.    Dementia with Parkinson's disease/oropharyngeal dysphagia/impaired mobility and ADLs/dysthymia    PAST MEDICAL & SURGICAL HISTORY:  Past Medical History:   Diagnosis Date   • Anemia    • Anxiety    • Cancer (CMS/HCC)    • Chronic pain    • Deaf    • Dementia    • Depression    • Dysphasia    • Hyperlipidemia    • Menieres disease    • Parkinson disease (CMS/HCC)       Past Surgical History:   Procedure Laterality Date   • INNER EAR SURGERY     • PROSTATECTOMY          MEDICATIONS:  Medication administration record and list reviewed on 7/25/2018     ALLERGIES:  Allergies   Allergen Reactions   • Depo-Medrol [Methylprednisolone Acetate] Unknown (See Comments)     Obtained from NH list. Pt nonverbal.   • Prednisone Unknown (See Comments)     Obtained from NH list. Pt nonverbal.        REVIEW OF SYSTEMS:  • Appetite: Fair []   Good [x]   Poor []   Weight Loss []  [x]  Weight Stable   Unavoidable Weight Loss []  Tolerating Tube Feeding []    Supplements Provided []   • Constitutional: Negative for fever, chills, diaphoresis or fatigue and weight change.   • HENT: No dysphagia; no changes to vision/hearing/smell/taste; no epistaxis  • Eyes: Negative for redness and visual disturbance.   • Respiratory: Negative for shortness of breath, chest pain, cough or chest tightness.   • Cardiovascular: Negative for chest pain and palpitations.   • Gastrointestinal: Negative for abdominal distention, abdominal pain and blood in stool.   • Endocrine: Negative for cold intolerance and heat intolerance.   • Genitourinary: Negative for difficulty urinating, dysuria and  frequency.Negative for hematuria   • Musculoskeletal: Chronic myalgias and arthralgias  • Integumentary: No open wounds, rash or concerning skin lesions  • Neurological: Negative for syncope, weakness and headaches.   • Hematological: Negative for adenopathy. Does not bruise/bleed easily.   • Immunological: Negative for reported allergies or immunological disorders  • Psychological: No depression, anxiety or suicidal ideation    PHYSICAL EXAMINATION:  VITAL SIGNS: /58, RR 22, HR 66 BPM, weight 100    General Appearance:  [x]  Alert   []  Oriented x 3  [x]  No acute distress    []  Confused  [x]  Disoriented   []  Comatose   Head:  Atraumatic and normocephalic, without obvious abnormality.   Eyes:          PERRLA, conjunctivae and sclerae normal, no Icterus. No pallor. Extra-occular movements are within normal limits.   Ears:  Ears appear intact with no abnormalities noted.   Throat: No oral lesions, no thrush, oral mucosa moist.   Neck: Supple, trachea midline, no thyromegaly, no carotid bruit.   Back:   No kyphoscoliosis. No tenderness to palpation.   Lungs:   Chest shape is normal. Breath sounds heard bilaterally equally.  No wheezing.  Bilateral basal crackles heard.    Heart:  Normal S1 and S2, no murmur, no gallop, no rub. No JVD.   Abdomen:   Normal bowel sounds, no masses, no organomegaly. Soft, non-tender, non-distended, no guarding    Extremities: Moves all extremities well, edema, cyanosis or clubbing.  Frail build.    Pulses: Pulses palpable and equal bilaterally.   Skin: No bleeding or rash.  Generalized dry skin noted.  Age-related atrophy of skin.   Neurologic: [] Normal speech []  Normal mental status    [x] Cranial nerves II through XII intact   [x]  No anosmia [x]  DTR 2+ [x]  Proprioception intact  []  No focal motor/sensory deficits  Chronic N/M def of adv PD     Psych/Mood:        [x]  No acute changes []  Depressed  Urinary:        []  Continent  [x]  Incontinent  []  Retention  []  F/C      []  UTI w/treatment in progress  RECORD REVIEW:   • Consult notes []   • Admission and subsequent notes []   •  [x] I have personally reviewed and interpreted available lab data, radiology studies and ECG obtained at time of visit.  [x] Medication Review: I have reviewed the patients active and prn medications at 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  Without reports of focal aspirations.  Continue aspiration precautions with dietary/lifestyle modifications additionally.  No reported acute behavioral changes, currently sleeping well with current medication regimen.  Continue current medication regimen.  Pain appears well controlled.  Routine surveillance labs as needed.  Continue to follow dietary intake.    [x]  Discussed Patient in detail with nursing/staff, addressed all needs today.     [x]  Plan of Care Reviewed   []   PT/OT Reviewed   []  Order Changes  []   Discharge Plans Reviewed         Total face to face time spent with patient 25 minutes, 15 min in counseling.    Naresh Gupta DO.  7/25/2018

## 2018-07-30 ENCOUNTER — NURSING HOME (OUTPATIENT)
Dept: FAMILY MEDICINE CLINIC | Facility: CLINIC | Age: 78
End: 2018-07-30

## 2018-07-30 DIAGNOSIS — F34.1 DYSTHYMIA: Chronic | ICD-10-CM

## 2018-07-30 DIAGNOSIS — G20 DEMENTIA DUE TO PARKINSON'S DISEASE WITH BEHAVIORAL DISTURBANCE (HCC): Primary | Chronic | ICD-10-CM

## 2018-07-30 DIAGNOSIS — R13.12 OROPHARYNGEAL DYSPHAGIA: Chronic | ICD-10-CM

## 2018-07-30 DIAGNOSIS — G20 PARKINSON'S DISEASE (HCC): Chronic | ICD-10-CM

## 2018-07-30 DIAGNOSIS — R54 AGE-RELATED PHYSICAL DEBILITY: Chronic | ICD-10-CM

## 2018-07-30 DIAGNOSIS — F02.818 DEMENTIA DUE TO PARKINSON'S DISEASE WITH BEHAVIORAL DISTURBANCE (HCC): Primary | Chronic | ICD-10-CM

## 2018-07-30 DIAGNOSIS — Z78.9 IMPAIRED MOBILITY AND ADLS: Chronic | ICD-10-CM

## 2018-07-30 DIAGNOSIS — Z74.09 IMPAIRED MOBILITY AND ADLS: Chronic | ICD-10-CM

## 2018-07-30 PROCEDURE — 99309 SBSQ NF CARE MODERATE MDM 30: CPT | Performed by: FAMILY MEDICINE

## 2018-08-08 ENCOUNTER — NURSING HOME (OUTPATIENT)
Dept: FAMILY MEDICINE CLINIC | Facility: CLINIC | Age: 78
End: 2018-08-08

## 2018-08-08 DIAGNOSIS — G20 PARKINSON'S DISEASE (HCC): Chronic | ICD-10-CM

## 2018-08-08 DIAGNOSIS — R13.12 OROPHARYNGEAL DYSPHAGIA: Chronic | ICD-10-CM

## 2018-08-08 DIAGNOSIS — Z74.09 IMPAIRED MOBILITY AND ADLS: Chronic | ICD-10-CM

## 2018-08-08 DIAGNOSIS — F02.818 DEMENTIA DUE TO PARKINSON'S DISEASE WITH BEHAVIORAL DISTURBANCE (HCC): Primary | Chronic | ICD-10-CM

## 2018-08-08 DIAGNOSIS — R54 AGE-RELATED PHYSICAL DEBILITY: Chronic | ICD-10-CM

## 2018-08-08 DIAGNOSIS — G20 DEMENTIA DUE TO PARKINSON'S DISEASE WITH BEHAVIORAL DISTURBANCE (HCC): Primary | Chronic | ICD-10-CM

## 2018-08-08 DIAGNOSIS — F34.1 DYSTHYMIA: Chronic | ICD-10-CM

## 2018-08-08 DIAGNOSIS — Z78.9 IMPAIRED MOBILITY AND ADLS: Chronic | ICD-10-CM

## 2018-08-08 PROCEDURE — 99309 SBSQ NF CARE MODERATE MDM 30: CPT | Performed by: FAMILY MEDICINE

## 2018-08-15 ENCOUNTER — NURSING HOME (OUTPATIENT)
Dept: FAMILY MEDICINE CLINIC | Facility: CLINIC | Age: 78
End: 2018-08-15

## 2018-08-15 DIAGNOSIS — F34.1 DYSTHYMIA: Chronic | ICD-10-CM

## 2018-08-15 DIAGNOSIS — G20 DEMENTIA DUE TO PARKINSON'S DISEASE WITH BEHAVIORAL DISTURBANCE (HCC): Chronic | ICD-10-CM

## 2018-08-15 DIAGNOSIS — R13.12 OROPHARYNGEAL DYSPHAGIA: Chronic | ICD-10-CM

## 2018-08-15 DIAGNOSIS — F02.818 DEMENTIA DUE TO PARKINSON'S DISEASE WITH BEHAVIORAL DISTURBANCE (HCC): Chronic | ICD-10-CM

## 2018-08-15 DIAGNOSIS — Z74.09 IMPAIRED MOBILITY AND ADLS: Chronic | ICD-10-CM

## 2018-08-15 DIAGNOSIS — R54 AGE-RELATED PHYSICAL DEBILITY: Primary | Chronic | ICD-10-CM

## 2018-08-15 DIAGNOSIS — G20 PARKINSON'S DISEASE (HCC): Chronic | ICD-10-CM

## 2018-08-15 DIAGNOSIS — Z78.9 IMPAIRED MOBILITY AND ADLS: Chronic | ICD-10-CM

## 2018-08-15 PROCEDURE — 99309 SBSQ NF CARE MODERATE MDM 30: CPT | Performed by: FAMILY MEDICINE

## 2018-08-27 ENCOUNTER — OUTSIDE FACILITY SERVICE (OUTPATIENT)
Dept: FAMILY MEDICINE CLINIC | Facility: CLINIC | Age: 78
End: 2018-08-27

## 2018-08-27 PROCEDURE — 99308 SBSQ NF CARE LOW MDM 20: CPT | Performed by: NURSE PRACTITIONER

## 2018-08-29 ENCOUNTER — NURSING HOME (OUTPATIENT)
Dept: FAMILY MEDICINE CLINIC | Facility: CLINIC | Age: 78
End: 2018-08-29

## 2018-08-29 DIAGNOSIS — F34.1 DYSTHYMIA: Chronic | ICD-10-CM

## 2018-08-29 DIAGNOSIS — G20 DEMENTIA DUE TO PARKINSON'S DISEASE WITH BEHAVIORAL DISTURBANCE (HCC): Chronic | ICD-10-CM

## 2018-08-29 DIAGNOSIS — Z74.09 IMPAIRED MOBILITY AND ADLS: Primary | Chronic | ICD-10-CM

## 2018-08-29 DIAGNOSIS — R13.12 OROPHARYNGEAL DYSPHAGIA: Chronic | ICD-10-CM

## 2018-08-29 DIAGNOSIS — Z78.9 IMPAIRED MOBILITY AND ADLS: Primary | Chronic | ICD-10-CM

## 2018-08-29 DIAGNOSIS — F02.818 DEMENTIA DUE TO PARKINSON'S DISEASE WITH BEHAVIORAL DISTURBANCE (HCC): Chronic | ICD-10-CM

## 2018-08-29 DIAGNOSIS — R54 AGE-RELATED PHYSICAL DEBILITY: Chronic | ICD-10-CM

## 2018-08-29 DIAGNOSIS — G20 PARKINSON'S DISEASE (HCC): Chronic | ICD-10-CM

## 2018-08-29 PROCEDURE — 99309 SBSQ NF CARE MODERATE MDM 30: CPT | Performed by: FAMILY MEDICINE

## 2018-09-05 ENCOUNTER — NURSING HOME (OUTPATIENT)
Dept: FAMILY MEDICINE CLINIC | Facility: CLINIC | Age: 78
End: 2018-09-05

## 2018-09-05 DIAGNOSIS — G20 DEMENTIA DUE TO PARKINSON'S DISEASE WITH BEHAVIORAL DISTURBANCE (HCC): Chronic | ICD-10-CM

## 2018-09-05 DIAGNOSIS — G20 PARKINSON'S DISEASE (HCC): Chronic | ICD-10-CM

## 2018-09-05 DIAGNOSIS — R13.12 OROPHARYNGEAL DYSPHAGIA: Chronic | ICD-10-CM

## 2018-09-05 DIAGNOSIS — F34.1 DYSTHYMIA: Chronic | ICD-10-CM

## 2018-09-05 DIAGNOSIS — R54 AGE-RELATED PHYSICAL DEBILITY: Chronic | ICD-10-CM

## 2018-09-05 DIAGNOSIS — Z74.09 IMPAIRED MOBILITY AND ADLS: Primary | Chronic | ICD-10-CM

## 2018-09-05 DIAGNOSIS — Z78.9 IMPAIRED MOBILITY AND ADLS: Primary | Chronic | ICD-10-CM

## 2018-09-05 DIAGNOSIS — F02.818 DEMENTIA DUE TO PARKINSON'S DISEASE WITH BEHAVIORAL DISTURBANCE (HCC): Chronic | ICD-10-CM

## 2018-09-05 PROCEDURE — 99309 SBSQ NF CARE MODERATE MDM 30: CPT | Performed by: FAMILY MEDICINE

## 2018-09-12 ENCOUNTER — NURSING HOME (OUTPATIENT)
Dept: FAMILY MEDICINE CLINIC | Facility: CLINIC | Age: 78
End: 2018-09-12

## 2018-09-12 DIAGNOSIS — G20 PARKINSON'S DISEASE (HCC): Chronic | ICD-10-CM

## 2018-09-12 DIAGNOSIS — R54 AGE-RELATED PHYSICAL DEBILITY: Chronic | ICD-10-CM

## 2018-09-12 DIAGNOSIS — F02.818 DEMENTIA DUE TO PARKINSON'S DISEASE WITH BEHAVIORAL DISTURBANCE (HCC): Primary | Chronic | ICD-10-CM

## 2018-09-12 DIAGNOSIS — F34.1 DYSTHYMIA: Chronic | ICD-10-CM

## 2018-09-12 DIAGNOSIS — Z78.9 IMPAIRED MOBILITY AND ADLS: Chronic | ICD-10-CM

## 2018-09-12 DIAGNOSIS — G20 DEMENTIA DUE TO PARKINSON'S DISEASE WITH BEHAVIORAL DISTURBANCE (HCC): Primary | Chronic | ICD-10-CM

## 2018-09-12 DIAGNOSIS — R13.12 OROPHARYNGEAL DYSPHAGIA: Chronic | ICD-10-CM

## 2018-09-12 DIAGNOSIS — Z74.09 IMPAIRED MOBILITY AND ADLS: Chronic | ICD-10-CM

## 2018-09-12 PROCEDURE — 99309 SBSQ NF CARE MODERATE MDM 30: CPT | Performed by: FAMILY MEDICINE

## 2018-09-12 NOTE — PROGRESS NOTES
Nursing Home Progress Note      Patient Name: Yves Wilkins   YOB: 1940    Date of Service: 07/30/2018      Facility: [place] []   [place] []   [facility] []   [facility] [x]   Other []       CHIEF COMPLAINT:  CMM/LTC     HISTORY OF PRESENT ILLNESS:  [x]  Follow Up visit for coordination of long term care issues and chronic medical management needs.    Dementia with Parkinson's disease/oropharyngeal dysphagia/impaired mobility and ADLs/dysthymia    PAST MEDICAL & SURGICAL HISTORY:  Past Medical History:   Diagnosis Date   • Anemia    • Anxiety    • Cancer (CMS/HCC)    • Chronic pain    • Deaf    • Dementia    • Depression    • Dysphasia    • Hyperlipidemia    • Menieres disease    • Parkinson disease (CMS/HCC)       Past Surgical History:   Procedure Laterality Date   • INNER EAR SURGERY     • PROSTATECTOMY          MEDICATIONS:  Medication administration record and list reviewed on 9/12/2018     ALLERGIES:  Allergies   Allergen Reactions   • Depo-Medrol [Methylprednisolone Acetate] Unknown (See Comments)     Obtained from NH list. Pt nonverbal.   • Prednisone Unknown (See Comments)     Obtained from NH list. Pt nonverbal.        REVIEW OF SYSTEMS:  • Appetite: Fair []   Good [x]   Poor []   Weight Loss []  [x]  Weight Stable   Unavoidable Weight Loss []  Tolerating Tube Feeding []    Supplements Provided []   • Constitutional: Negative for fever, chills, diaphoresis or fatigue and weight change.   • HENT: No dysphagia; no changes to vision/hearing/smell/taste; no epistaxis  • Eyes: Negative for redness and visual disturbance.   • Respiratory: Negative for shortness of breath, chest pain, cough or chest tightness.   • Cardiovascular: Negative for chest pain and palpitations.   • Gastrointestinal: Negative for abdominal distention, abdominal pain and blood in stool.   • Endocrine: Negative for cold intolerance and heat intolerance.   • Genitourinary: Negative for difficulty urinating, dysuria and  frequency.Negative for hematuria   • Musculoskeletal: Chronic myalgias and arthralgias  • Integumentary: No open wounds, rash or concerning skin lesions  • Neurological: Negative for syncope, weakness and headaches.   • Hematological: Negative for adenopathy. Does not bruise/bleed easily.   • Immunological: Negative for reported allergies or immunological disorders  • Psychological: No depression, anxiety or suicidal ideation    PHYSICAL EXAMINATION:  VITAL SIGNS: /56, HR 70 bpm, RR 18, weight 113    General Appearance:  [x]  Alert   []  Oriented x 3  [x]  No acute distress    []  Confused  [x]  Disoriented   []  Comatose   Head:  Atraumatic and normocephalic, without obvious abnormality.   Eyes:          PERRLA, conjunctivae and sclerae normal, no Icterus. No pallor. Extra-occular movements are within normal limits.   Ears:  Ears appear intact with no abnormalities noted.   Throat: No oral lesions, no thrush, oral mucosa moist.   Neck: Supple, trachea midline, no thyromegaly, no carotid bruit.   Back:   No kyphoscoliosis. No tenderness to palpation.   Lungs:   Chest shape is normal. Breath sounds heard bilaterally equally.  No wheezing.  Bilateral basal crackles heard.    Heart:  Normal S1 and S2, no murmur, no gallop, no rub. No JVD.   Abdomen:   Normal bowel sounds, no masses, no organomegaly. Soft, non-tender, non-distended, no guarding    Extremities: Moves all extremities well, edema, cyanosis or clubbing.  Frail build.    Pulses: Pulses palpable and equal bilaterally.   Skin: No bleeding or rash.  Generalized dry skin noted.  Age-related atrophy of skin.   Neurologic: [] Normal speech []  Normal mental status    [x] Cranial nerves II through XII intact   [x]  No anosmia [x]  DTR 2+ [x]  Proprioception intact  []  No focal motor/sensory deficits  Chronic N/M def of adv PD     Psych/Mood:        [x]  No acute changes []  Depressed  Urinary:        []  Continent  [x]  Incontinent  []  Retention  []  F/C      []  UTI w/treatment in progress  RECORD REVIEW:   • Consult notes []   • Admission and subsequent notes []   •  [x] I have personally reviewed and interpreted available lab data, radiology studies and ECG obtained at time of visit.  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  Without reports of focal/persistent aspirations.  Continue aspiration precautions with dietary/lifestyle modifications additionally.  No reported acute behavioral changes, currently sleeping well with current medication regimen with no changes today.  Pain appears well controlled.  Routine surveillance labs when needed.  Continue to follow dietary intake.    [x]  Discussed Patient in detail with nursing/staff, addressed all needs today.     [x]  Plan of Care Reviewed   []   PT/OT Reviewed   []  Order Changes  []   Discharge Plans Reviewed         Total face to face time spent with patient 25 minutes, 15 min in counseling.    Naresh Gupta DO.  7/30/2018

## 2018-09-19 ENCOUNTER — NURSING HOME (OUTPATIENT)
Dept: FAMILY MEDICINE CLINIC | Facility: CLINIC | Age: 78
End: 2018-09-19

## 2018-09-19 DIAGNOSIS — G20 DEMENTIA DUE TO PARKINSON'S DISEASE WITH BEHAVIORAL DISTURBANCE (HCC): Chronic | ICD-10-CM

## 2018-09-19 DIAGNOSIS — Z74.09 IMPAIRED MOBILITY AND ADLS: Chronic | ICD-10-CM

## 2018-09-19 DIAGNOSIS — F34.1 DYSTHYMIA: Chronic | ICD-10-CM

## 2018-09-19 DIAGNOSIS — R13.12 OROPHARYNGEAL DYSPHAGIA: Chronic | ICD-10-CM

## 2018-09-19 DIAGNOSIS — Z78.9 IMPAIRED MOBILITY AND ADLS: Chronic | ICD-10-CM

## 2018-09-19 DIAGNOSIS — R54 AGE-RELATED PHYSICAL DEBILITY: Chronic | ICD-10-CM

## 2018-09-19 DIAGNOSIS — F02.818 DEMENTIA DUE TO PARKINSON'S DISEASE WITH BEHAVIORAL DISTURBANCE (HCC): Chronic | ICD-10-CM

## 2018-09-19 DIAGNOSIS — G20 PARKINSON'S DISEASE (HCC): Primary | Chronic | ICD-10-CM

## 2018-09-19 PROCEDURE — 99309 SBSQ NF CARE MODERATE MDM 30: CPT | Performed by: FAMILY MEDICINE

## 2018-09-26 ENCOUNTER — NURSING HOME (OUTPATIENT)
Dept: FAMILY MEDICINE CLINIC | Facility: CLINIC | Age: 78
End: 2018-09-26

## 2018-09-26 DIAGNOSIS — F02.818 DEMENTIA DUE TO PARKINSON'S DISEASE WITH BEHAVIORAL DISTURBANCE (HCC): Chronic | ICD-10-CM

## 2018-09-26 DIAGNOSIS — Z74.09 IMPAIRED MOBILITY AND ADLS: Chronic | ICD-10-CM

## 2018-09-26 DIAGNOSIS — G20 PARKINSON'S DISEASE (HCC): Chronic | ICD-10-CM

## 2018-09-26 DIAGNOSIS — Z78.9 IMPAIRED MOBILITY AND ADLS: Chronic | ICD-10-CM

## 2018-09-26 DIAGNOSIS — F34.1 DYSTHYMIA: Chronic | ICD-10-CM

## 2018-09-26 DIAGNOSIS — R13.12 OROPHARYNGEAL DYSPHAGIA: Primary | Chronic | ICD-10-CM

## 2018-09-26 DIAGNOSIS — G20 DEMENTIA DUE TO PARKINSON'S DISEASE WITH BEHAVIORAL DISTURBANCE (HCC): Chronic | ICD-10-CM

## 2018-09-26 DIAGNOSIS — R54 AGE-RELATED PHYSICAL DEBILITY: Chronic | ICD-10-CM

## 2018-09-26 PROCEDURE — 99309 SBSQ NF CARE MODERATE MDM 30: CPT | Performed by: FAMILY MEDICINE

## 2018-09-26 NOTE — PROGRESS NOTES
Nursing Home Progress Note      Patient Name: Yves Wilkins   YOB: 1940    Date of Service: 8/8/2018      Facility: [place] []   [place] []   [facility] []   [facility] [x]   Other []       CHIEF COMPLAINT:  CMM/LTC     HISTORY OF PRESENT ILLNESS:  [x]  Follow Up visit for coordination of long term care issues and chronic medical management needs.    Dementia with Parkinson's disease/oropharyngeal dysphagia/impaired mobility and ADLs/dysthymia    PAST MEDICAL & SURGICAL HISTORY:  Past Medical History:   Diagnosis Date   • Anemia    • Anxiety    • Cancer (CMS/HCC)    • Chronic pain    • Deaf    • Dementia    • Depression    • Dysphasia    • Hyperlipidemia    • Menieres disease    • Parkinson disease (CMS/HCC)       Past Surgical History:   Procedure Laterality Date   • INNER EAR SURGERY     • PROSTATECTOMY          MEDICATIONS:  Medication administration record and list reviewed on 9/26/2018     ALLERGIES:  Allergies   Allergen Reactions   • Depo-Medrol [Methylprednisolone Acetate] Unknown (See Comments)     Obtained from NH list. Pt nonverbal.   • Prednisone Unknown (See Comments)     Obtained from NH list. Pt nonverbal.        REVIEW OF SYSTEMS:  • Appetite: Fair []   Good [x]   Poor []   Weight Loss []  [x]  Weight Stable   Unavoidable Weight Loss []  Tolerating Tube Feeding []    Supplements Provided []   • Constitutional: Negative for fever, chills, diaphoresis or fatigue and weight change.   • HENT: No dysphagia; no changes to vision/hearing/smell/taste; no epistaxis  • Eyes: Negative for redness and visual disturbance.   • Respiratory: Negative for shortness of breath, chest pain, cough or chest tightness.   • Cardiovascular: Negative for chest pain and palpitations.   • Gastrointestinal: Negative for abdominal distention, abdominal pain and blood in stool.   • Endocrine: Negative for cold intolerance and heat intolerance.   • Genitourinary: Negative for difficulty urinating, dysuria and  frequency.Negative for hematuria   • Musculoskeletal: Chronic myalgias and arthralgias  • Integumentary: No open wounds, rash or concerning skin lesions  • Neurological: Negative for syncope, weakness and headaches.   • Hematological: Negative for adenopathy. Does not bruise/bleed easily.   • Immunological: Negative for reported allergies or immunological disorders  • Psychological: No depression, anxiety or suicidal ideation    PHYSICAL EXAMINATION:  VITAL SIGNS: /76, HR 80 bpm, RR 16, weight 113    General Appearance:  [x]  Alert   []  Oriented x 3  [x]  No acute distress    []  Confused  [x]  Disoriented   []  Comatose   Head:  Atraumatic and normocephalic, without obvious abnormality.   Eyes:          PERRLA, conjunctivae and sclerae normal, no Icterus. No pallor. Extra-occular movements are within normal limits.   Ears:  Ears appear intact with no abnormalities noted.   Throat: No oral lesions, no thrush, oral mucosa moist.   Neck: Supple, trachea midline, no thyromegaly, no carotid bruit.   Back:   No kyphoscoliosis. No tenderness to palpation.   Lungs:   Chest shape is normal. Breath sounds heard bilaterally equally.  No wheezing.  Bilateral basal crackles heard.    Heart:  Normal S1 and S2, no murmur, no gallop, no rub. No JVD.   Abdomen:   Normal bowel sounds, no masses, no organomegaly. Soft, non-tender, non-distended, no guarding    Extremities: Moves all extremities well, edema, cyanosis or clubbing.  Frail build.    Pulses: Pulses palpable and equal bilaterally.   Skin: No bleeding or rash.  Generalized dry skin noted.  Age-related atrophy of skin.   Neurologic: [] Normal speech []  Normal mental status    [x] Cranial nerves II through XII intact   [x]  No anosmia [x]  DTR 2+ [x]  Proprioception intact  []  No focal motor/sensory deficits  Chronic N/M def of adv PD     Psych/Mood:        [x]  No acute changes []  Depressed  Urinary:        []  Continent  [x]  Incontinent  []  Retention  []  F/C      []  UTI w/treatment in progress  RECORD REVIEW:   • Consult notes []   • Admission and subsequent notes []   •  [x] I have personally reviewed and interpreted available lab data, radiology studies and ECG obtained at time of visit.  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  Without reports or signs of focal/persistent aspirations.  Continue aspiration precautions with dietary/lifestyle modifications additionally.  No reported acute behavioral changes, currently sleeping well with current medication regimen.  Pain appears well controlled.  Routine surveillance labs.  Continue to follow dietary intake.  No reports of falls.    [x]  Discussed Patient in detail with nursing/staff, addressed all needs today.     [x]  Plan of Care Reviewed   []   PT/OT Reviewed   []  Order Changes  []   Discharge Plans Reviewed         Total face to face time spent with patient 25 minutes, 15 min in counseling.    Naresh Gupta DO.  8/8/2018

## 2018-10-10 RX ORDER — HYDROCODONE BITARTRATE AND ACETAMINOPHEN 10; 325 MG/1; MG/1
1 TABLET ORAL EVERY 4 HOURS PRN
Qty: 240 TABLET | Refills: 0 | Status: SHIPPED | OUTPATIENT
Start: 2018-10-10

## 2018-10-17 ENCOUNTER — NURSING HOME (OUTPATIENT)
Dept: FAMILY MEDICINE CLINIC | Facility: CLINIC | Age: 78
End: 2018-10-17

## 2018-10-17 DIAGNOSIS — Z78.9 IMPAIRED MOBILITY AND ADLS: Chronic | ICD-10-CM

## 2018-10-17 DIAGNOSIS — F02.818 DEMENTIA DUE TO PARKINSON'S DISEASE WITH BEHAVIORAL DISTURBANCE (HCC): Chronic | ICD-10-CM

## 2018-10-17 DIAGNOSIS — G20 PARKINSON'S DISEASE (HCC): Chronic | ICD-10-CM

## 2018-10-17 DIAGNOSIS — R54 AGE-RELATED PHYSICAL DEBILITY: Primary | Chronic | ICD-10-CM

## 2018-10-17 DIAGNOSIS — Z74.09 IMPAIRED MOBILITY AND ADLS: Chronic | ICD-10-CM

## 2018-10-17 DIAGNOSIS — F34.1 DYSTHYMIA: Chronic | ICD-10-CM

## 2018-10-17 DIAGNOSIS — G20 DEMENTIA DUE TO PARKINSON'S DISEASE WITH BEHAVIORAL DISTURBANCE (HCC): Chronic | ICD-10-CM

## 2018-10-17 DIAGNOSIS — R13.12 OROPHARYNGEAL DYSPHAGIA: Chronic | ICD-10-CM

## 2018-10-17 PROCEDURE — 99309 SBSQ NF CARE MODERATE MDM 30: CPT | Performed by: FAMILY MEDICINE

## 2018-10-17 NOTE — PROGRESS NOTES
Nursing Home Progress Note      Patient Name: Yves Wilkins   YOB: 1940    Date of Service: 8/22/2018      Facility: [place] []   [place] []   [facility] []   [facility] [x]   Other []       CHIEF COMPLAINT:  CMM/LTC     HISTORY OF PRESENT ILLNESS:  [x]  Follow Up visit for coordination of long term care issues and chronic medical management needs.    Dementia with Parkinson's disease/oropharyngeal dysphagia/impaired mobility and ADLs/dysthymia    PAST MEDICAL & SURGICAL HISTORY:  Past Medical History:   Diagnosis Date   • Anemia    • Anxiety    • Cancer (CMS/HCC)    • Chronic pain    • Deaf    • Dementia    • Depression    • Dysphasia    • Hyperlipidemia    • Menieres disease    • Parkinson disease (CMS/HCC)       Past Surgical History:   Procedure Laterality Date   • INNER EAR SURGERY     • PROSTATECTOMY          MEDICATIONS:  Medication administration record and list reviewed on 10/17/2018     ALLERGIES:  Allergies   Allergen Reactions   • Depo-Medrol [Methylprednisolone Acetate] Unknown (See Comments)     Obtained from NH list. Pt nonverbal.   • Prednisone Unknown (See Comments)     Obtained from NH list. Pt nonverbal.        REVIEW OF SYSTEMS:  • Appetite: Fair []   Good [x]   Poor []   Weight Loss []  [x]  Weight Stable   Unavoidable Weight Loss []  Tolerating Tube Feeding []    Supplements Provided []   • Constitutional: Negative for fever, chills, diaphoresis or fatigue and weight change.   • HENT: No dysphagia; no changes to vision/hearing/smell/taste; no epistaxis  • Eyes: Negative for redness and visual disturbance.   • Respiratory: Negative for shortness of breath, chest pain, cough or chest tightness.   • Cardiovascular: Negative for chest pain and palpitations.   • Gastrointestinal: Negative for abdominal distention, abdominal pain and blood in stool.   • Endocrine: Negative for cold intolerance and heat intolerance.   • Genitourinary: Negative for difficulty urinating, dysuria and  frequency.Negative for hematuria   • Musculoskeletal: Chronic myalgias and arthralgias  • Integumentary: No open wounds, rash or concerning skin lesions  • Neurological: Negative for syncope, weakness and headaches.   • Hematological: Negative for adenopathy. Does not bruise/bleed easily.   • Immunological: Negative for reported allergies or immunological disorders  • Psychological: No depression, anxiety or suicidal ideation    PHYSICAL EXAMINATION:  VITAL SIGNS: /62, HR 66 BPM, RR 16, weight 113    General Appearance:  [x]  Alert   []  Oriented x 3  [x]  No acute distress    []  Confused  [x]  Disoriented   []  Comatose   Head:  Atraumatic and normocephalic, without obvious abnormality.   Eyes:          PERRLA, conjunctivae and sclerae normal, no Icterus. No pallor. Extra-occular movements are within normal limits.   Ears:  Ears appear intact with no abnormalities noted.   Throat: No oral lesions, no thrush, oral mucosa moist.   Neck: Supple, trachea midline, no thyromegaly, no carotid bruit.   Back:   No kyphoscoliosis. No tenderness to palpation.   Lungs:   Chest shape is normal. Breath sounds heard bilaterally equally.  No wheezing.  Bilateral basal crackles heard.    Heart:  Normal S1 and S2, no murmur, no gallop, no rub. No JVD.   Abdomen:   Normal bowel sounds, no masses, no organomegaly. Soft, non-tender, non-distended, no guarding    Extremities: Moves all extremities well, edema, cyanosis or clubbing.  Frail build.    Pulses: Pulses palpable and equal bilaterally.   Skin: No bleeding or rash.  Generalized dry skin noted.  Age-related atrophy of skin.   Neurologic: [] Normal speech []  Normal mental status    [x] Cranial nerves II through XII intact   [x]  No anosmia [x]  DTR 2+ [x]  Proprioception intact  []  No focal motor/sensory deficits  Chronic N/M def of adv PD     Psych/Mood:        [x]  No acute changes []  Depressed  Urinary:        []  Continent  [x]  Incontinent  []  Retention  []  F/C      []  UTI w/treatment in progress  RECORD REVIEW:   • Consult notes []   • Admission and subsequent notes []   •  [x] I have personally reviewed and interpreted available lab data, radiology studies and ECG obtained at time of visit.  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  Without reports or signs of focal/persistent aspirations.  Continue aspiration precautions with dietary/lifestyle modifications additionally.  No reported acute behavioral changes, currently sleeping well with current medication regimen.  Pain appears well controlled.  Continue surveillance labs.  Continue to follow dietary intake.  No reports of falls or injuries.    [x]  Discussed Patient in detail with nursing/staff, addressed all needs today.     [x]  Plan of Care Reviewed   []   PT/OT Reviewed   []  Order Changes  []   Discharge Plans Reviewed         Total face to face time spent with patient 25 minutes, 15 min in counseling.    Naresh Gupta DO.  8/22/2018

## 2018-10-24 ENCOUNTER — NURSING HOME (OUTPATIENT)
Dept: FAMILY MEDICINE CLINIC | Facility: CLINIC | Age: 78
End: 2018-10-24

## 2018-10-24 DIAGNOSIS — F34.1 DYSTHYMIA: Chronic | ICD-10-CM

## 2018-10-24 DIAGNOSIS — R54 AGE-RELATED PHYSICAL DEBILITY: Primary | Chronic | ICD-10-CM

## 2018-10-24 DIAGNOSIS — G20 PARKINSON'S DISEASE (HCC): Chronic | ICD-10-CM

## 2018-10-24 DIAGNOSIS — F02.818 DEMENTIA DUE TO PARKINSON'S DISEASE WITH BEHAVIORAL DISTURBANCE (HCC): Chronic | ICD-10-CM

## 2018-10-24 DIAGNOSIS — Z74.09 IMPAIRED MOBILITY AND ADLS: Chronic | ICD-10-CM

## 2018-10-24 DIAGNOSIS — R13.12 OROPHARYNGEAL DYSPHAGIA: Chronic | ICD-10-CM

## 2018-10-24 DIAGNOSIS — G20 DEMENTIA DUE TO PARKINSON'S DISEASE WITH BEHAVIORAL DISTURBANCE (HCC): Chronic | ICD-10-CM

## 2018-10-24 DIAGNOSIS — Z78.9 IMPAIRED MOBILITY AND ADLS: Chronic | ICD-10-CM

## 2018-10-24 PROCEDURE — 99308 SBSQ NF CARE LOW MDM 20: CPT | Performed by: FAMILY MEDICINE

## 2018-10-31 NOTE — PROGRESS NOTES
Nursing Home Progress Note      Patient Name: Yves Wilkins   YOB: 1940    Date of Service: 8/29/2018      Facility: [place] []   [place] []   [facility] []   [facility] [x]   Other []       CHIEF COMPLAINT:  CMM/LTC     HISTORY OF PRESENT ILLNESS:  [x]  Follow Up visit for coordination of long term care issues and chronic medical management needs.    Dementia with Parkinson's disease/oropharyngeal dysphagia/impaired mobility and ADLs/dysthymia    PAST MEDICAL & SURGICAL HISTORY:  Past Medical History:   Diagnosis Date   • Anemia    • Anxiety    • Cancer (CMS/HCC)    • Chronic pain    • Deaf    • Dementia    • Depression    • Dysphasia    • Hyperlipidemia    • Menieres disease    • Parkinson disease (CMS/HCC)       Past Surgical History:   Procedure Laterality Date   • INNER EAR SURGERY     • PROSTATECTOMY          MEDICATIONS:  Medication administration record and list reviewed on 10/31/2018     ALLERGIES:  Allergies   Allergen Reactions   • Depo-Medrol [Methylprednisolone Acetate] Unknown (See Comments)     Obtained from NH list. Pt nonverbal.   • Prednisone Unknown (See Comments)     Obtained from NH list. Pt nonverbal.        REVIEW OF SYSTEMS:  • Appetite: Fair []   Good [x]   Poor []   Weight Loss []  [x]  Weight Stable   Unavoidable Weight Loss []  Tolerating Tube Feeding []    Supplements Provided []   • Constitutional: Negative for fever, chills, diaphoresis or fatigue and weight change.   • HENT: No dysphagia; no changes to vision/hearing/smell/taste; no epistaxis  • Eyes: Negative for redness and visual disturbance.   • Respiratory: Negative for shortness of breath, chest pain, cough or chest tightness.   • Cardiovascular: Negative for chest pain and palpitations.   • Gastrointestinal: Negative for abdominal distention, abdominal pain and blood in stool.   • Endocrine: Negative for cold intolerance and heat intolerance.   • Genitourinary: Negative for difficulty urinating, dysuria and  frequency.Negative for hematuria   • Musculoskeletal: Chronic myalgias and arthralgias  • Integumentary: No open wounds, rash or concerning skin lesions  • Neurological: Negative for syncope, weakness and headaches.   • Hematological: Negative for adenopathy. Does not bruise/bleed easily.   • Immunological: Negative for reported allergies or immunological disorders  • Psychological: No depression, anxiety or suicidal ideation    PHYSICAL EXAMINATION:  VITAL SIGNS: /60, HR 64 BPM, RR 18, weight 112    General Appearance:  [x]  Alert   []  Oriented x 3  [x]  No acute distress    []  Confused  [x]  Disoriented   []  Comatose   Head:  Atraumatic and normocephalic, without obvious abnormality.   Eyes:          PERRLA, conjunctivae and sclerae normal, no Icterus. No pallor. Extra-occular movements are within normal limits.   Ears:  Ears appear intact with no abnormalities noted.   Throat: No oral lesions, no thrush, oral mucosa moist.   Neck: Supple, trachea midline, no thyromegaly, no carotid bruit.   Back:   No kyphoscoliosis. No tenderness to palpation.   Lungs:   Chest shape is normal. Breath sounds heard bilaterally equally.  No wheezing.  Bilateral basal crackles heard.    Heart:  Normal S1 and S2, no murmur, no gallop, no rub. No JVD.   Abdomen:   Normal bowel sounds, no masses, no organomegaly. Soft, non-tender, non-distended, no guarding    Extremities: Moves all extremities well, edema, cyanosis or clubbing.  Frail build.    Pulses: Pulses palpable and equal bilaterally.   Skin: No bleeding or rash.  Generalized dry skin noted.  Age-related atrophy of skin.   Neurologic: [] Normal speech []  Normal mental status    [x] Cranial nerves II through XII intact   [x]  No anosmia [x]  DTR 2+ [x]  Proprioception intact  []  No focal motor/sensory deficits  Chronic N/M def of adv PD     Psych/Mood:        [x]  No acute changes []  Depressed  Urinary:        []  Continent  [x]  Incontinent  []  Retention  []  F/C      []  UTI w/treatment in progress  RECORD REVIEW:   • Consult notes []   • Admission and subsequent notes []   •  [x] I have personally reviewed and interpreted available lab data, radiology studies and ECG obtained at time of visit.  [x] Medication Review: I have reviewed the patients active and prn medications at [facility]     ASSESSMENT   Diagnoses and all orders for this visit:    Impaired mobility and ADLs    Age-related physical debility    Parkinson's disease (CMS/[facility])    Dementia due to Parkinson's disease with behavioral disturbance (CMS/[facility])    Oropharyngeal dysphagia    Dysthymia        PLAN  Continue supportive care as needed for all activities of daily living and mobilization.  Without reports or signs of focal/persistent aspirations.  Continue aspiration precautions with dietary/lifestyle modifications additionally.  No reported acute behavioral changes, currently sleeping well with current medication regimen.  Pain appears clinically well controlled.  Continue surveillance labs.  Continue to follow dietary intake, approximately 1 pound loss.  No reports of falls or injuries since last visit.    [x]  Discussed Patient in detail with nursing/staff, addressed all needs today.     [x]  Plan of Care Reviewed   []   PT/OT Reviewed   []  Order Changes  []   Discharge Plans Reviewed         Total face to face time spent with patient 25 minutes, 15 min in counseling.    Naresh Gupta DO.  8/29/2018

## 2018-11-08 NOTE — PROGRESS NOTES
Nursing Home Progress Note      Patient Name: Yves Wilkins   YOB: 1940    Date of Service: 9/5/2018      Facility: [place] []   [place] []   [facility] []   [facility] [x]   Other []       CHIEF COMPLAINT:  CMM/LTC     HISTORY OF PRESENT ILLNESS:  [x]  Follow Up visit for coordination of long term care issues and chronic medical management needs.    Dementia with Parkinson's disease/oropharyngeal dysphagia/impaired mobility and ADLs/dysthymia    PAST MEDICAL & SURGICAL HISTORY:  Past Medical History:   Diagnosis Date   • Anemia    • Anxiety    • Cancer (CMS/HCC)    • Chronic pain    • Deaf    • Dementia    • Depression    • Dysphasia    • Hyperlipidemia    • Menieres disease    • Parkinson disease (CMS/HCC)       Past Surgical History:   Procedure Laterality Date   • INNER EAR SURGERY     • PROSTATECTOMY          MEDICATIONS:  Medication administration record and list reviewed on 11/8/2018     ALLERGIES:  Allergies   Allergen Reactions   • Depo-Medrol [Methylprednisolone Acetate] Unknown (See Comments)     Obtained from NH list. Pt nonverbal.   • Prednisone Unknown (See Comments)     Obtained from NH list. Pt nonverbal.        REVIEW OF SYSTEMS:  • Appetite: Fair []   Good [x]   Poor []   Weight Loss []  [x]  Weight Stable   Unavoidable Weight Loss []  Tolerating Tube Feeding []    Supplements Provided []   • Constitutional: Negative for fever, chills, diaphoresis or fatigue and weight change.   • HENT: No dysphagia; no changes to vision/hearing/smell/taste; no epistaxis  • Eyes: Negative for redness and visual disturbance.   • Respiratory: Negative for shortness of breath, chest pain, cough or chest tightness.   • Cardiovascular: Negative for chest pain and palpitations.   • Gastrointestinal: Negative for abdominal distention, abdominal pain and blood in stool.   • Endocrine: Negative for cold intolerance and heat intolerance.   • Genitourinary: Negative for difficulty urinating, dysuria and  frequency.Negative for hematuria   • Musculoskeletal: Chronic myalgias and arthralgias  • Integumentary: No open wounds, rash or concerning skin lesions  • Neurological: Negative for syncope, weakness and headaches.   • Hematological: Negative for adenopathy. Does not bruise/bleed easily.   • Immunological: Negative for reported allergies or immunological disorders  • Psychological: No depression, anxiety or suicidal ideation    PHYSICAL EXAMINATION:  VITAL SIGNS: /78, HR 72 bpm, RR 16, weight 109    General Appearance:  [x]  Alert   []  Oriented x 3  [x]  No acute distress    []  Confused  [x]  Disoriented   []  Comatose   Head:  Atraumatic and normocephalic, without obvious abnormality.   Eyes:          PERRLA, conjunctivae and sclerae normal, no Icterus. No pallor. Extra-occular movements are within normal limits.   Ears:  Ears appear intact with no abnormalities noted.   Throat: No oral lesions, no thrush, oral mucosa moist.   Neck: Supple, trachea midline, no thyromegaly, no carotid bruit.   Back:   No kyphoscoliosis. No tenderness to palpation.   Lungs:   Chest shape is normal. Breath sounds heard bilaterally equally.  No wheezing.  Bilateral basal crackles heard.    Heart:  Normal S1 and S2, no murmur, no gallop, no rub. No JVD.   Abdomen:   Normal bowel sounds, no masses, no organomegaly. Soft, non-tender, non-distended, no guarding    Extremities: Moves all extremities well, edema, cyanosis or clubbing.  Frail build.    Pulses: Pulses palpable and equal bilaterally.   Skin: No bleeding or rash.  Generalized dry skin noted.  Age-related atrophy of skin.   Neurologic: [] Normal speech []  Normal mental status    [x] Cranial nerves II through XII intact   [x]  No anosmia [x]  DTR 2+ [x]  Proprioception intact  []  No focal motor/sensory deficits  Chronic N/M def of adv PD     Psych/Mood:        [x]  No acute changes []  Depressed  Urinary:        []  Continent  [x]  Incontinent  []  Retention  []  F/C      []  UTI w/treatment in progress  RECORD REVIEW:   • Consult notes []   • Admission and subsequent notes []   •  [x] I have personally reviewed and interpreted available lab data, radiology studies and ECG obtained at time of visit.  [x] Medication Review: I have reviewed the patients active and prn medications at [facility]     ASSESSMENT   Diagnoses and all orders for this visit:    Impaired mobility and ADLs    Age-related physical debility    Dysthymia    Parkinson's disease (CMS/[facility])    Dementia due to Parkinson's disease with behavioral disturbance (CMS/[facility])    Oropharyngeal dysphagia        PLAN  Continue supportive care as needed for all activities of daily living and mobilization.  Without reports or signs of focal/persistent aspirations.  Continue aspiration precautions with dietary/lifestyle modifications additionally.  No reported acute behavioral changes, currently sleeping well with current medication regimen.  Pain appears clinically well controlled.  Continue surveillance labs.  Continue to follow dietary intake, approximately 3 pound loss; patient does have periodic weight changes as a result of his fluctuating appetite.  No reports of falls or injuries.    [x]  Discussed Patient in detail with nursing/staff, addressed all needs today.     [x]  Plan of Care Reviewed   []   PT/OT Reviewed   []  Order Changes  []   Discharge Plans Reviewed         Total face to face time spent with patient 25 minutes, 15 min in counseling.    Naresh Gupta DO.  9/5/2018

## 2018-11-10 RX ORDER — DIAZEPAM 10 MG/1
TABLET ORAL
Qty: 45 TABLET | Refills: 5 | Status: SHIPPED | OUTPATIENT
Start: 2018-11-10

## 2018-12-02 NOTE — PROGRESS NOTES
Nursing Home Progress Note      Patient Name: Yves Wilkins   YOB: 1940    Date of Service: 9/12/2018      Facility: [place] []   [place] []   [facility] []   [facility] [x]   Other []       CHIEF COMPLAINT:  CMM/LTC     HISTORY OF PRESENT ILLNESS:  [x]  Follow Up visit for coordination of long term care issues and chronic medical management needs.    Dementia with Parkinson's disease/oropharyngeal dysphagia/impaired mobility and ADLs/dysthymia    PAST MEDICAL & SURGICAL HISTORY:  Past Medical History:   Diagnosis Date   • Anemia    • Anxiety    • Cancer (CMS/HCC)    • Chronic pain    • Deaf    • Dementia    • Depression    • Dysphasia    • Hyperlipidemia    • Menieres disease    • Parkinson disease (CMS/HCC)       Past Surgical History:   Procedure Laterality Date   • INNER EAR SURGERY     • PROSTATECTOMY          MEDICATIONS:  Medication administration record and list reviewed on 12/1/2018     ALLERGIES:  Allergies   Allergen Reactions   • Depo-Medrol [Methylprednisolone Acetate] Unknown (See Comments)     Obtained from NH list. Pt nonverbal.   • Prednisone Unknown (See Comments)     Obtained from NH list. Pt nonverbal.        REVIEW OF SYSTEMS:  • Appetite: Fair []   Good [x]   Poor []   Weight Loss []  [x]  Weight Stable   Unavoidable Weight Loss []  Tolerating Tube Feeding []    Supplements Provided []   • Constitutional: Negative for fever, chills, diaphoresis or fatigue and weight change.   • HENT: No dysphagia; no changes to vision/hearing/smell/taste; no epistaxis  • Eyes: Negative for redness and visual disturbance.   • Respiratory: Negative for shortness of breath, chest pain, cough or chest tightness.   • Cardiovascular: Negative for chest pain and palpitations.   • Gastrointestinal: Negative for abdominal distention, abdominal pain and blood in stool.   • Endocrine: Negative for cold intolerance and heat intolerance.   • Genitourinary: Negative for difficulty urinating, dysuria and  frequency.Negative for hematuria   • Musculoskeletal: Chronic myalgias and arthralgias  • Integumentary: No open wounds, rash or concerning skin lesions  • Neurological: Negative for syncope, weakness and headaches.   • Hematological: Negative for adenopathy. Does not bruise/bleed easily.   • Immunological: Negative for reported allergies or immunological disorders  • Psychological: No depression, anxiety or suicidal ideation    PHYSICAL EXAMINATION:  VITAL SIGNS: /76, HR 78 bpm, RR 18, weight 108    General Appearance:  [x]  Alert   []  Oriented x 3  [x]  No acute distress    []  Confused  [x]  Disoriented   []  Comatose   Head:  Atraumatic and normocephalic, without obvious abnormality.   Eyes:          PERRLA, conjunctivae and sclerae normal, no Icterus. No pallor. Extra-occular movements are within normal limits.   Ears:  Ears appear intact with no abnormalities noted.   Throat: No oral lesions, no thrush, oral mucosa moist.   Neck: Supple, trachea midline, no thyromegaly, no carotid bruit.   Back:   No kyphoscoliosis. No tenderness to palpation.   Lungs:   Chest shape is normal. Breath sounds heard bilaterally equally.  No wheezing.  Bilateral basal crackles heard.    Heart:  Normal S1 and S2, no murmur, no gallop, no rub. No JVD.   Abdomen:   Normal bowel sounds, no masses, no organomegaly. Soft, non-tender, non-distended, no guarding    Extremities: Moves all extremities well, edema, cyanosis or clubbing.  Frail build.    Pulses: Pulses palpable and equal bilaterally.   Skin: No bleeding or rash.  Generalized dry skin noted.  Age-related atrophy of skin.   Neurologic: [] Normal speech []  Normal mental status    [x] Cranial nerves II through XII intact   [x]  No anosmia [x]  DTR 2+ [x]  Proprioception intact  []  No focal motor/sensory deficits  Chronic N/M def of adv PD     Psych/Mood:        [x]  No acute changes []  Depressed  Urinary:        []  Continent  [x]  Incontinent  []  Retention  []  F/C      []  UTI w/treatment in progress  RECORD REVIEW:   • Consult notes []   • Admission and subsequent notes []   •  [x] I have personally reviewed and interpreted available lab data, radiology studies and ECG obtained at time of visit.  [x] Medication Review: I have reviewed the patients active and prn medications at [facility]     ASSESSMENT   Diagnoses and all orders for this visit:    Dementia due to Parkinson's disease with behavioral disturbance (CMS/[facility])    Oropharyngeal dysphagia    Impaired mobility and ADLs    Age-related physical debility    Parkinson's disease (CMS/[facility])    Dysthymia        PLAN  Continue supportive care as needed for all activities of daily living and mobilization.  Without reports or signs of focal/persistent aspirations.  Continue aspiration precautions with dietary/lifestyle modifications additionally.  No reported acute behavioral changes, currently sleeping well with current medication regimen.  Pain appears clinically well controlled.  Continue surveillance labs.  Continue to follow dietary intake, approximately 1 pound loss.  No reports of falls or injuries.    [x]  Discussed Patient in detail with nursing/staff, addressed all needs today.     [x]  Plan of Care Reviewed   []   PT/OT Reviewed   []  Order Changes  []   Discharge Plans Reviewed         Total face to face time spent with patient 25 minutes, 15 min in counseling.    Naresh Gupta DO.  9/12/2018

## 2018-12-05 NOTE — PROGRESS NOTES
Nursing Home Progress Note      Patient Name: Yves Wilkins   YOB: 1940    Date of Service: 9/19/2018      Facility: [place] []   [place] []   [facility] []   [facility] [x]   Other []       CHIEF COMPLAINT:  CMM/LTC     HISTORY OF PRESENT ILLNESS:  [x]  Follow Up visit for coordination of long term care issues and chronic medical management needs.    Dementia with Parkinson's disease/oropharyngeal dysphagia/impaired mobility and ADLs/dysthymia    PAST MEDICAL & SURGICAL HISTORY:  Past Medical History:   Diagnosis Date   • Anemia    • Anxiety    • Cancer (CMS/HCC)    • Chronic pain    • Deaf    • Dementia    • Depression    • Dysphasia    • Hyperlipidemia    • Menieres disease    • Parkinson disease (CMS/HCC)       Past Surgical History:   Procedure Laterality Date   • INNER EAR SURGERY     • PROSTATECTOMY          MEDICATIONS:  Medication administration record and list reviewed on 12/5/2018     ALLERGIES:  Allergies   Allergen Reactions   • Depo-Medrol [Methylprednisolone Acetate] Unknown (See Comments)     Obtained from NH list. Pt nonverbal.   • Prednisone Unknown (See Comments)     Obtained from NH list. Pt nonverbal.        REVIEW OF SYSTEMS:  • Appetite: Fair []   Good []   Poor, at times [x]   Weight Loss [x]  []  Weight Stable   Unavoidable Weight Loss []  Tolerating Tube Feeding []    Supplements Provided []   • Constitutional: Negative for fever, chills, diaphoresis or fatigue and weight change.   • HENT: No dysphagia; no changes to vision/hearing/smell/taste; no epistaxis  • Eyes: Negative for redness and visual disturbance.   • Respiratory: Negative for shortness of breath, chest pain, cough or chest tightness.   • Cardiovascular: Negative for chest pain and palpitations.   • Gastrointestinal: Negative for abdominal distention, abdominal pain and blood in stool.   • Endocrine: Negative for cold intolerance and heat intolerance.   • Genitourinary: Negative for difficulty urinating, dysuria and  frequency.Negative for hematuria   • Musculoskeletal: Chronic myalgias and arthralgias  • Integumentary: No open wounds, rash or concerning skin lesions  • Neurological: Negative for syncope, weakness and headaches.   • Hematological: Negative for adenopathy. Does not bruise/bleed easily.   • Immunological: Negative for reported allergies or immunological disorders  • Psychological: No depression, anxiety or suicidal ideation    PHYSICAL EXAMINATION:  VITAL SIGNS: /78, HR 68 bpm, RR 16, weight 99    General Appearance:  [x]  Alert   []  Oriented x 3  [x]  No acute distress    []  Confused  [x]  Disoriented   []  Comatose   Head:  Atraumatic and normocephalic, without obvious abnormality.   Eyes:          PERRLA, conjunctivae and sclerae normal, no Icterus. No pallor. Extra-occular movements are within normal limits.   Ears:  Ears appear intact with no abnormalities noted.   Throat: No oral lesions, no thrush, oral mucosa moist.   Neck: Supple, trachea midline, no thyromegaly, no carotid bruit.   Back:   No kyphoscoliosis. No tenderness to palpation.   Lungs:   Chest shape is normal. Breath sounds heard bilaterally equally.  No wheezing.  Bilateral basal crackles heard.    Heart:  Normal S1 and S2, no murmur, no gallop, no rub. No JVD.   Abdomen:   Normal bowel sounds, no masses, no organomegaly. Soft, non-tender, non-distended, no guarding    Extremities: Moves all extremities well, edema, cyanosis or clubbing.  Frail build.    Pulses: Pulses palpable and equal bilaterally.   Skin: No bleeding or rash.  Generalized dry skin noted.  Age-related atrophy of skin.   Neurologic: [] Normal speech []  Normal mental status    [x] Cranial nerves II through XII intact   [x]  No anosmia [x]  DTR 2+ [x]  Proprioception intact  []  No focal motor/sensory deficits  Chronic N/M def of adv PD     Psych/Mood:        [x]  No acute changes []  Depressed  Urinary:        []  Continent  [x]  Incontinent  []  Retention  []  F/C      []  UTI w/treatment in progress  RECORD REVIEW:   • Consult notes []   • Admission and subsequent notes []   •  [x] I have personally reviewed and interpreted available lab data, radiology studies and ECG obtained at time of visit.  [x] Medication Review: I have [facility] the [facility]     ASSESSMENT   Diagnoses and all orders for this visit:    Parkinson's disease (CMS/[facility])    Dementia due to Parkinson's disease with behavioral disturbance (CMS/[facility])    Impaired mobility and ADLs    Age-related physical debility    Dysthymia    Oropharyngeal dysphagia        PLAN  Continue supportive care as needed for all activities of daily living and mobilization.  Without reports or signs of focal/persistent aspirations.  Continue aspiration precautions with dietary/lifestyle modifications additionally.  No reported acute behavioral changes, currently sleeping well with current medication regimen.  Pain appears clinically well controlled.  Continue surveillance labs.      Patient does demonstrate failure to thrive this time, of expected nature given his advancing Parkinson's disease and associated dementia.  Continue to follow dietary intake, approximately 9 pound loss.  No reports of falls or injuries.    [x]  Discussed Patient in detail with nursing/staff, addressed all needs today.     [x]  Plan of Care Reviewed   []   PT/OT Reviewed   []  Order Changes  []   Discharge Plans Reviewed         Total face to face time spent with patient 25 minutes, 15 min in counseling.    Naresh Gupta DO.  9/19/2018

## 2018-12-19 NOTE — PROGRESS NOTES
Nursing Home Progress Note      Patient Name: Yves Wilkins   YOB: 1940    Date of Service: 09/26/2018    Facility: [facility] []   [place] []   [facility] []   [facility] [x]   Other []       CHIEF COMPLAINT:  CMM/LTC     HISTORY OF PRESENT ILLNESS:  [x]  Follow Up visit for coordination of long term care issues and chronic medical management needs.    Dementia with Parkinson's disease/oropharyngeal dysphagia/impaired mobility and ADLs/dysthymia    PAST MEDICAL & SURGICAL HISTORY:  Past Medical History:   Diagnosis Date   • Anemia    • Anxiety    • Cancer (CMS/HCC)    • Chronic pain    • Deaf    • Dementia    • Depression    • Dysphasia    • Hyperlipidemia    • Menieres disease    • Parkinson disease (CMS/HCC)       Past Surgical History:   Procedure Laterality Date   • INNER EAR SURGERY     • PROSTATECTOMY          MEDICATIONS:  Medication administration record and list reviewed on 12/19/2018     ALLERGIES:  Allergies   Allergen Reactions   • Depo-Medrol [Methylprednisolone Acetate] Unknown (See Comments)     Obtained from NH list. Pt nonverbal.   • Prednisone Unknown (See Comments)     Obtained from NH list. Pt nonverbal.        REVIEW OF SYSTEMS:  • Appetite: Fair []   Good []   Poor, at times [x]   Weight Loss [x]  []  Weight Stable   Unavoidable Weight Loss [x]  Tolerating Tube Feeding []    Supplements Provided []   • Constitutional: Negative for fever, chills, diaphoresis or fatigue and weight change.   • HENT: No dysphagia; no changes to vision/hearing/smell/taste; no epistaxis  • Eyes: Negative for redness and visual disturbance.   • Respiratory: Negative for shortness of breath, chest pain, cough or chest tightness.   • Cardiovascular: Negative for chest pain and palpitations.   • Gastrointestinal: Negative for abdominal distention, abdominal pain and blood in stool.   • Endocrine: Negative for cold intolerance and heat intolerance.   • Genitourinary: Negative for difficulty urinating, dysuria and  frequency.Negative for hematuria   • Musculoskeletal: Chronic myalgias and arthralgias  • Integumentary: No open wounds, rash or concerning skin lesions  • Neurological: Negative for syncope, weakness and headaches.   • Hematological: Negative for adenopathy. Does not bruise/bleed easily.   • Immunological: Negative for reported allergies or immunological disorders  • Psychological: No depression, anxiety or suicidal ideation    PHYSICAL EXAMINATION:  VITAL SIGNS: /76, HR 78 bpm, RR 18, weight 96    General Appearance:  [x]  Alert   []  Oriented x 3  [x]  No acute distress    []  Confused  [x]  Disoriented   []  Comatose   Head:  Atraumatic and normocephalic, without obvious abnormality.   Eyes:          PERRLA, conjunctivae and sclerae normal, no Icterus. No pallor. Extra-occular movements are within normal limits.   Ears:  Ears appear intact with no abnormalities noted.   Throat: No oral lesions, no thrush, oral mucosa moist.   Neck: Supple, trachea midline, no thyromegaly, no carotid bruit.   Back:   No kyphoscoliosis. No tenderness to palpation.   Lungs:   Chest shape is normal. Breath sounds heard bilaterally equally.  No wheezing.  Bilateral basal crackles heard.    Heart:  Normal S1 and S2, no murmur, no gallop, no rub. No JVD.   Abdomen:   Normal bowel sounds, no masses, no organomegaly. Soft, non-tender, non-distended, no guarding    Extremities: Moves all extremities well, edema, cyanosis or clubbing.  Frail build.    Pulses: Pulses palpable and equal bilaterally.   Skin: No bleeding or rash.  Generalized dry skin noted.  Age-related atrophy of skin.   Neurologic: [] Normal speech []  Normal mental status    [x] Cranial nerves II through XII intact   [x]  No anosmia [x]  DTR 2+ [x]  Proprioception intact  []  No focal motor/sensory deficits  Chronic N/M def of adv PD     Psych/Mood:        [x]  No acute changes []  Depressed  Urinary:        []  Continent  [x]  Incontinent  []  Retention  []  F/C      []  UTI w/treatment in progress  RECORD REVIEW:   • Consult notes []   • Admission and subsequent notes []   •  [x] I have personally reviewed and interpreted available lab data, radiology studies and ECG obtained at time of visit.  [x] Medication Review: I have reviewed the patients active and [facility]     ASSESSMENT   Diagnoses and all orders for this visit:    Oropharyngeal dysphagia    Parkinson's disease (CMS/[facility])    Impaired mobility and ADLs    Dysthymia    Dementia due to Parkinson's disease with behavioral disturbance (CMS/[facility])    Age-related physical debility        PLAN  Continue supportive care as needed for all activities of daily living and mobilization.  Without reports or signs of focal/persistent aspirations.  Continue aspiration precautions with dietary/lifestyle modifications additionally.  No reported acute behavioral changes, currently sleeping well with current medication regimen.  Pain appears clinically well controlled.    He and 9 used to demonstrate failure to thrive, of expected nature given his advancing Parkinson's disease and associated dementia.  Continue to follow dietary intake, approximately 3 pound loss.  No reports of falls or injuries.    [x]  Discussed Patient in detail with nursing/staff, addressed all needs today.     [x]  Plan of Care Reviewed   []   PT/OT Reviewed   []  Order Changes  []   Discharge Plans Reviewed         Total face to face time spent with patient 25 minutes, 15 min in counseling.      09/26/2018